# Patient Record
Sex: FEMALE | Race: WHITE | URBAN - METROPOLITAN AREA
[De-identification: names, ages, dates, MRNs, and addresses within clinical notes are randomized per-mention and may not be internally consistent; named-entity substitution may affect disease eponyms.]

---

## 2018-01-17 ENCOUNTER — OFFICE VISIT (OUTPATIENT)
Dept: PEDIATRICS | Facility: CLINIC | Age: 23
End: 2018-01-17
Payer: COMMERCIAL

## 2018-01-17 VITALS
HEART RATE: 93 BPM | HEIGHT: 65 IN | OXYGEN SATURATION: 99 % | SYSTOLIC BLOOD PRESSURE: 100 MMHG | WEIGHT: 124 LBS | DIASTOLIC BLOOD PRESSURE: 60 MMHG | BODY MASS INDEX: 20.66 KG/M2 | TEMPERATURE: 98.3 F

## 2018-01-17 DIAGNOSIS — N92.6 IRREGULAR PERIODS: ICD-10-CM

## 2018-01-17 DIAGNOSIS — R10.2 SUPRAPUBIC ABDOMINAL PAIN: Primary | ICD-10-CM

## 2018-01-17 DIAGNOSIS — R30.0 DYSURIA: ICD-10-CM

## 2018-01-17 DIAGNOSIS — Z11.3 ROUTINE SCREENING FOR STI (SEXUALLY TRANSMITTED INFECTION): ICD-10-CM

## 2018-01-17 DIAGNOSIS — N94.6 DYSMENORRHEA: ICD-10-CM

## 2018-01-17 LAB
ALBUMIN UR-MCNC: NEGATIVE MG/DL
APPEARANCE UR: CLEAR
BETA HCG QUAL IFA URINE: NEGATIVE
BILIRUB UR QL STRIP: NEGATIVE
COLOR UR AUTO: YELLOW
GLUCOSE UR STRIP-MCNC: NEGATIVE MG/DL
HGB UR QL STRIP: NEGATIVE
KETONES UR STRIP-MCNC: NEGATIVE MG/DL
LEUKOCYTE ESTERASE UR QL STRIP: NEGATIVE
NITRATE UR QL: NEGATIVE
PH UR STRIP: 5.5 PH (ref 5–7)
PROLACTIN SERPL-MCNC: 19 UG/L (ref 3–27)
SOURCE: NORMAL
SP GR UR STRIP: 1.01 (ref 1–1.03)
UROBILINOGEN UR STRIP-ACNC: 0.2 EU/DL (ref 0.2–1)

## 2018-01-17 PROCEDURE — 86780 TREPONEMA PALLIDUM: CPT | Performed by: INTERNAL MEDICINE

## 2018-01-17 PROCEDURE — 84146 ASSAY OF PROLACTIN: CPT | Performed by: INTERNAL MEDICINE

## 2018-01-17 PROCEDURE — 87591 N.GONORRHOEAE DNA AMP PROB: CPT | Performed by: INTERNAL MEDICINE

## 2018-01-17 PROCEDURE — 80053 COMPREHEN METABOLIC PANEL: CPT | Performed by: INTERNAL MEDICINE

## 2018-01-17 PROCEDURE — 84703 CHORIONIC GONADOTROPIN ASSAY: CPT | Performed by: INTERNAL MEDICINE

## 2018-01-17 PROCEDURE — 87389 HIV-1 AG W/HIV-1&-2 AB AG IA: CPT | Performed by: INTERNAL MEDICINE

## 2018-01-17 PROCEDURE — 36415 COLL VENOUS BLD VENIPUNCTURE: CPT | Performed by: INTERNAL MEDICINE

## 2018-01-17 PROCEDURE — 87491 CHLMYD TRACH DNA AMP PROBE: CPT | Performed by: INTERNAL MEDICINE

## 2018-01-17 PROCEDURE — 81003 URINALYSIS AUTO W/O SCOPE: CPT | Performed by: INTERNAL MEDICINE

## 2018-01-17 PROCEDURE — 99204 OFFICE O/P NEW MOD 45 MIN: CPT | Performed by: INTERNAL MEDICINE

## 2018-01-17 PROCEDURE — 84443 ASSAY THYROID STIM HORMONE: CPT | Performed by: INTERNAL MEDICINE

## 2018-01-17 RX ORDER — IBUPROFEN 600 MG/1
600 TABLET, FILM COATED ORAL
COMMUNITY
Start: 2017-11-10 | End: 2018-02-05

## 2018-01-17 ASSESSMENT — PATIENT HEALTH QUESTIONNAIRE - PHQ9
SUM OF ALL RESPONSES TO PHQ QUESTIONS 1-9: 6
SUM OF ALL RESPONSES TO PHQ QUESTIONS 1-9: 6
10. IF YOU CHECKED OFF ANY PROBLEMS, HOW DIFFICULT HAVE THESE PROBLEMS MADE IT FOR YOU TO DO YOUR WORK, TAKE CARE OF THINGS AT HOME, OR GET ALONG WITH OTHER PEOPLE: SOMEWHAT DIFFICULT

## 2018-01-17 NOTE — NURSING NOTE
"Chief Complaint   Patient presents with     Physical     Abdominal Pain       Initial /60 (BP Location: Right arm, Cuff Size: Adult Regular)  Pulse 93  Temp 98.3  F (36.8  C) (Oral)  Ht 5' 5\" (1.651 m)  Wt 124 lb (56.2 kg)  SpO2 99%  BMI 20.63 kg/m2 Estimated body mass index is 20.63 kg/(m^2) as calculated from the following:    Height as of this encounter: 5' 5\" (1.651 m).    Weight as of this encounter: 124 lb (56.2 kg).  Medication Reconciliation: complete   Toshia Childs MA    "

## 2018-01-17 NOTE — PROGRESS NOTES
SUBJECTIVE:   Elle Elizondo is a 22 year old female who presents to clinic today for the following health issues:    ABDOMINAL   PAIN     Onset: Ongoing for a few months    Description:   Character: Stabbing and Fullness  Location: pelvic region  Radiation: None    Intensity: moderate    Progression of Symptoms:  worsening    Accompanying Signs & Symptoms:  Fever/Chills?: Chills  Gas/Bloating: YES  Nausea: YES  Vomitting: YES  Diarrhea?: YES  Constipation:no   Dysuria or Hematuria: no    History:   Trauma: no   Previous similar pain: no    Previous tests done: Was seen in [facility] ER 1 month ago for abdominal pain ? UTI.     Precipitating factors:   Does the pain change with:     Food: no    BM: no     Urination: YES    Alleviating factors: None.      Therapies Tried and outcome: Nitrofurantonin     LMP:       Here for a Physical but decided to focus instead on abdominal pain.     PHQ-2 Score:     PHQ-2 ( 1999 Pfizer) 1/17/2018   Q1: Little interest or pleasure in doing things 1   Q2: Feeling down, depressed or hopeless 2   PHQ-2 Score 3   Q1: Little interest or pleasure in doing things Several days   Q2: Feeling down, depressed or hopeless More than half the days   PHQ-2 Score 3     PHQ-9 SCORE 1/17/2018   Total Score MyChart 6 (Mild depression)   Total Score 6     Pain has been on and off for several months.   Was evaluated at [facility] on 11/10/17 for this -> diagnosed with hemorrhagic cystitis and given an antibiotic.   She was given a referral to OB-GYN but has not seen them.  Pain did improve with the antibiotic.     She generally notices the pain with her periods.   Got her period at age 13 and always had cramps. But this is much more severe and makes her want to vomit.  Periods have been more irregular the past several months. +/- 7 days.  Lasting shorter amount of days, less heavy. Sometimes only lasts one day.     Pain is becoming more persistent.  Had severe pain 1 week ago.  Had some pain  "this morning.  Has a medication from [place] that helps - not sure of the name.   Took a dose of ibuprofen but felt that it made her feel sick.    LMP 12/22/17 - lasted one day.  Not doing anything for pregnancy prevention.   Would be okay if she got pregnant but also willing to be on birth control to help the pain.    Sexually active with 1 male partner ().   No history of unwanted sexual touch.  Some clear vaginal discharge - this hasn't changed.   No history of STIs.    Had an appendectomy a few years ago. They told her her ovary had cysts on it but that this wasn't unusual.     Problem list and histories reviewed & adjusted, as indicated.  Additional history: as documented    There is no problem list on file for this patient.    Past Surgical History:   Procedure Laterality Date     APPENDECTOMY  2016       Social History   Substance Use Topics     Smoking status: Never Smoker     Smokeless tobacco: Never Used     Alcohol use No     Family History   Problem Relation Age of Onset     Family History Negative Mother      Family History Negative Father      Family History Negative Brother      Family History Negative Brother      Family History Negative Brother          Current Outpatient Prescriptions   Medication Sig Dispense Refill     ibuprofen (ADVIL/MOTRIN) 600 MG tablet Take 600 mg by mouth       No Known Allergies    Reviewed and updated as needed this visit by clinical staffTobacco  Allergies  Meds  Med Hx  Surg Hx  Fam Hx       Reviewed and updated as needed this visit by Provider  Med Hx  Surg Hx  Fam Hx       ROS:  Constitutional, HEENT, cardiovascular, pulmonary, GI, , musculoskeletal, neuro, skin, endocrine and psych systems are negative, except as otherwise noted.    OBJECTIVE:   /60 (BP Location: Right arm, Cuff Size: Adult Regular)  Pulse 93  Temp 98.3  F (36.8  C) (Oral)  Ht 5' 5\" (1.651 m)  Wt 124 lb (56.2 kg)  SpO2 99%  BMI 20.63 kg/m2  Body mass index is 20.63 " kg/(m^2).     GENERAL: healthy, alert and no distress  EYES: Eyes grossly normal to inspection, PERRL and conjunctivae and sclerae normal  HENT: ear canals and TM's normal, nose and mouth without ulcers or lesions  NECK: no adenopathy, no asymmetry, masses, or scars and thyroid normal to palpation  RESP: lungs clear to auscultation - no rales, rhonchi or wheezes  CV: regular rate and rhythm, normal S1 S2, no S3 or S4, no murmur, click or rub, no peripheral edema and peripheral pulses strong  ABDOMEN: soft, minimal suprapubic tenderness, no hepatosplenomegaly, no masses and bowel sounds normal   (female): normal female external genitalia, normal urethral meatus, vaginal mucosa pink, moist, well rugated, and normal cervix/adnexa/uterus without masses or discharge  MS: no gross musculoskeletal defects noted, no edema  SKIN: no suspicious lesions or rashes  NEURO: Normal strength and tone, mentation intact and speech normal  PSYCH: mentation appears normal, affect normal/bright    Diagnostic Test Results:  Results for orders placed or performed in visit on 01/17/18 (from the past 24 hour(s))   UA reflex to Microscopic and Culture   Result Value Ref Range    Color Urine Yellow     Appearance Urine Clear     Glucose Urine Negative NEG^Negative mg/dL    Bilirubin Urine Negative NEG^Negative    Ketones Urine Negative NEG^Negative mg/dL    Specific Gravity Urine 1.010 1.003 - 1.035    Blood Urine Negative NEG^Negative    pH Urine 5.5 5.0 - 7.0 pH    Protein Albumin Urine Negative NEG^Negative mg/dL    Urobilinogen Urine 0.2 0.2 - 1.0 EU/dL    Nitrite Urine Negative NEG^Negative    Leukocyte Esterase Urine Negative NEG^Negative    Source Midstream Urine    Beta HCG qual IFA urine - [facility]   Result Value Ref Range    Beta HCG Qual IFA Urine Negative NEG^Negative        Other labs pending.    ASSESSMENT/PLAN:       ICD-10-CM    1. Suprapubic abdominal pain R10.2 NEISSERIA GONORRHOEA PCR     CHLAMYDIA TRACHOMATIS  PCR     Comprehensive metabolic panel     hCG qual urine POCT     US Pelvic Complete with Transvaginal     Beta HCG qual IFA urine - [facility] and [facility]   2. Dysmenorrhea N94.6    3. Dysuria R30.0 UA reflex to Microscopic and Culture   4. Irregular periods N92.6 TSH with free T4 reflex     Prolactin     Beta HCG qual IFA urine - [facility] and [place]   5. Routine screening for STI (sexually transmitted infection) Z11.3 HIV Antigen Antibody Combo     Anti Treponema     Suspect suprapubic pain is related to menstrual cycles however on history is becoming more persistent. She is not using anything for pregnancy prevention and is sexually active - UPT negative; atopic is possible but less likely given duration of symptoms. UA neg for any infection. No history of STIs and no cervical motion tenderness. Ovarian cysts are also possible. Will start with basic labs and an ultrasound. If normal will consider treatment with OCPs for dysmenorrhea- briefly discussed with patient and she is open to starting birth control if it helps with her pain. Variation in cycles likely normal but will also check thyroid and prolactin. She has no hirsutism and is not overweight - less likely to have PCOS.    Patient Instructions   Nice to meet you today!    Labs today.     Pelvic ultrasound - this is done at [facility]. They will call you to schedule.     Make an appointment to see me at the end of next week - we can talk about your lab and imaging results.    If you feel like you are getting your period - take the ibuprofen 600 mg right away. Take this with food. Try a heating pad on your lower back.    After we figure out your abdominal pain I will have you come back to see me for a Wellness exam.    Price Mcclellan MD  [facility]

## 2018-01-17 NOTE — MR AVS SNAPSHOT
After Visit Summary   1/17/2018    Elle Elizondo    MRN: [id number]           Patient Information     Date Of Birth          1995        Visit Information        Provider Department      1/17/2018 11:10 AM Price Mcclellan MD [facility]        Today's Diagnoses     Dysuria    -  1    Suprapubic abdominal pain        Irregular periods        Routine screening for STI (sexually transmitted infection)          Care Instructions    Nice to meet you today!    Labs today.     Pelvic ultrasound - this is done at [facility]. They will call you to schedule.     Make an appointment to see me at the end of next week - we can talk about your lab and imaging results.    If you feel like you are getting your period - take the ibuprofen 600 mg right away. Take this with food. Try a heating pad on your lower back.    After we figure out your abdominal pain I will have you come back to see me for a Wellness exam.      Preventive Health Recommendations  Female Ages 18 to 25     Yearly exam:     See your health care provider every year in order to  o Review health changes.   o Discuss preventive care.    o Review your medicines if your doctor has prescribed any.      You should be tested each year for STDs (sexually transmitted diseases).       After age 20, talk to your provider about how often you should have cholesterol testing.      Starting at age 21, get a Pap test every three years. If you have an abnormal result, your doctor may have you test more often.      If you are at risk for diabetes, you should have a diabetes test (fasting glucose).     Shots:     Get a flu shot each year.     Get a tetanus shot every 10 years.     Consider getting the shot (vaccine) that prevents cervical cancer (Gardasil).    Nutrition:     Eat at least 5 servings of fruits and vegetables each day.    Eat whole-grain bread, whole-wheat pasta and brown rice instead of white grains and rice.    Talk  "to your provider about Calcium and Vitamin D.     Lifestyle    Exercise at least 150 minutes a week each week (30 minutes a day, 5 days a week). This will help you control your weight and prevent disease.    Limit alcohol to one drink per day.    No smoking.     Wear sunscreen to prevent skin cancer.    See your dentist every six months for an exam and cleaning.          Follow-ups after your visit        Follow-up notes from your care team     Return in about 1 week (around 1/24/2018).      Future tests that were ordered for you today     Open Future Orders        Priority Expected Expires Ordered    US Pelvic Complete with Transvaginal Routine  1/17/2019 1/17/2018            Who to contact     If you have questions or need follow up information about today's clinic visit or your schedule please contact [facility] directly at [phone].  Normal or non-critical lab and imaging results will be communicated to you by [employer], letter or phone within 4 business days after the clinic has received the results. If you do not hear from us within 7 days, please contact the clinic through [employer] or phone. If you have a critical or abnormal lab result, we will notify you by phone as soon as possible.  Submit refill requests through [employer]ZA or call your pharmacy and they will forward the refill request to us. Please allow 3 business days for your refill to be completed.          Additional Information About Your Visit        ELENZA Information     ELENZA lets you send messages to your doctor, view your test results, renew your prescriptions, schedule appointments and more. To sign up, go to [email or web address] . Click on \"Log in\" on the left side of the screen, which will take you to the Welcome page. Then click on \"Sign up Now\" on the right side of the page.     You will be asked to enter the access code listed below, as well as some personal information. Please follow the directions to create your " "username and password.     Your access code is: [email or web address]  Expires: 2018 12:10 PM     Your access code will  in 90 days. If you need help or a new code, please call your [facility] clinic or [phone].        Care EveryWhere ID     This is your Care EveryWhere ID. This could be used by other organizations to access your [facility] medical records  [id number]        Your Vitals Were     Pulse Temperature Height Pulse Oximetry BMI (Body Mass Index)       93 98.3  F (36.8  C) (Oral) 5' 5\" (1.651 m) 99% 20.63 kg/m2        Blood Pressure from Last 3 Encounters:   18 100/60    Weight from Last 3 Encounters:   18 124 lb (56.2 kg)              We Performed the Following     Anti Treponema     CHLAMYDIA TRACHOMATIS PCR     Comprehensive metabolic panel     hCG qual urine POCT     HIV Antigen Antibody Combo     NEISSERIA GONORRHOEA PCR     Prolactin     TSH with free T4 reflex     UA reflex to Microscopic and Culture        Primary Care Provider Office Phone # Fax #    Price Jocy Mcclellan [phone]       [facility] [address]        Equal Access to Services     IMELDA GILES AH: Hadii jessenia richmond hadasho Soomaali, waaxda luqadaha, qaybta kaalmada adeegyada, annette mancian yoel singh. So [facility] [phone].    ATENCIÓN: Si habla español, tiene a crocker disposición servicios gratuitos de asistencia lingüística. Rosangela al [phone].    We comply with applicable federal civil rights laws and [place] laws. We do not discriminate on the basis of race, color, national origin, age, disability, sex, sexual orientation, or gender identity.            Thank you!     Thank you for choosing [facility]  for your care. Our goal is always to provide you with excellent care. Hearing back from our patients is one way we can continue to improve our services. Please take a few minutes to complete the written survey that you may receive in the mail after your " visit with us. Thank you!             Your Updated Medication List - Protect others around you: Learn how to safely use, store and throw away your medicines at www.disposemymeds.org.      Notice  As of 1/17/2018 12:10 PM    You have not been prescribed any medications.

## 2018-01-17 NOTE — PROGRESS NOTES
"  SUBJECTIVE:   Elle Elizondo is a 22 year old female who presents to clinic today for the following health issues:    ABDOMINAL   PAIN     Onset: Ongoing for a few months    Description:   Character: Stabbing and Fullness  Location: pelvic region  Radiation: None    Intensity: moderate    Progression of Symptoms:  worsening    Accompanying Signs & Symptoms:  Fever/Chills?: Chills  Gas/Bloating: YES  Nausea: YES  Vomitting: YES  Diarrhea?: YES  Constipation:no   Dysuria or Hematuria: no    History:   Trauma: no   Previous similar pain: no    Previous tests done: Was seen in [facility] 1 month ago for abdominal pain ? UTI.     Precipitating factors:   Does the pain change with:     Food: YES     BM: no     Urination: YES    Alleviating factors:      Therapies Tried and outcome: Nitrofurantonin     LMP:       Here for a Physical but decided to focus instead on abdominal pain.     Was seen once before     {additional problems for provider to add:390541}    Problem list and histories reviewed & adjusted, as indicated.  Additional history: {NONE - AS DOCUMENTED:917118::\"as documented\"}    {HIST REVIEW/ LINKS 2:559592}    Reviewed and updated as needed this visit by clinical staff  Tobacco  Allergies  Meds       Reviewed and updated as needed this visit by Provider         {PROVIDER CHARTING PREFERENCE:887989}      CC: Elle Elizondo is an 22 year old woman who presents for preventive health visit.     Physical   Annual:     Getting at least 3 servings of Calcium per day::  Yes    Bi-annual eye exam::  NO    Dental care twice a year::  NO    Sleep apnea or symptoms of sleep apnea::  None    Diet::  Regular (no restrictions)    Frequency of exercise::  None    Taking medications regularly::  Yes    Medication side effects::  None    Additional concerns today::  No                Today's PHQ-2 Score:   PHQ-2 ( 1999 Pfizer) 1/17/2018   Q1: Little interest or pleasure in doing things 1   Q2: Feeling down, " "depressed or hopeless 2   PHQ-2 Score 3   Q1: Little interest or pleasure in doing things Several days   Q2: Feeling down, depressed or hopeless More than half the days   PHQ-2 Score 3       Abuse: Current or Past(Physical, Sexual or Emotional)- No  Do you feel safe in your environment - Yes    Social History   Substance Use Topics     Smoking status: Never Smoker     Smokeless tobacco: Never Used     Alcohol use No     Alcohol Use 1/17/2018   If you drink alcohol, do you typically have greater than 3 drinks per day OR greater than 7 drinks per week?   No   {add AUDIT responses (Optional) (A score of 7 for adult men is an indication of hazardous drinking; a score of 8 or more is an indication of an alcohol use disorder.  A score of 7 or more for adult women is an indication of hazardous drinking or an alchohol use disorder):106806}    Reviewed orders with patient.  Reviewed health maintenance and updated orders accordingly - {Yes/No:544554::\"Yes\"}  {Chronicprobdata (Optional):788419}    {Mammo Decision Support (Optional):366403}    Pertinent mammograms are reviewed under the imaging tab.  History of abnormal Pap smear: {PAP HX:829751}    Reviewed and updated as needed this visit by clinical staff  Tobacco  Allergies  Meds         Reviewed and updated as needed this visit by Provider        {HISTORY OPTIONS (Optional):403456}    Review of Systems  {FEMALE PREVENTATIVE ROS:457129}     OBJECTIVE:   /60 (BP Location: Right arm, Cuff Size: Adult Regular)  Pulse 93  Temp 98.3  F (36.8  C) (Oral)  Ht 5' 5\" (1.651 m)  Wt 124 lb (56.2 kg)  SpO2 99%  BMI 20.63 kg/m2  Physical Exam  {Exam Choices:555528}    ASSESSMENT/PLAN:   {Diag Picklist:955017}    COUNSELING:  {FEMALE COUNSELING MESSAGES:314525::\"Reviewed preventive health counseling, as reflected in patient instructions\"}    {BP Counseling- Complete if BP >= 120/80  (Optional):571749}     reports that she has never smoked. She has never used smokeless " "tobacco.  {Tobacco Cessation -- Complete if patient is a smoker (Optional):161631}  Estimated body mass index is 20.63 kg/(m^2) as calculated from the following:    Height as of this encounter: 5' 5\" (1.651 m).    Weight as of this encounter: 124 lb (56.2 kg).       Counseling Resources:  ATP IV Guidelines  Pooled Cohorts Equation Calculator  Breast Cancer Risk Calculator  FRAX Risk Assessment  ICSI Preventive Guidelines  Dietary Guidelines for Americans, 2010  USDA's MyPlate  ASA Prophylaxis  Lung CA Screening    Price Mcclellan MD  [facility]  "

## 2018-01-17 NOTE — PATIENT INSTRUCTIONS
Nice to meet you today!    Labs today.     Pelvic ultrasound - this is done at [facility]. They will call you to schedule.     Make an appointment to see me at the end of next week - we can talk about your lab and imaging results.    If you feel like you are getting your period - take the ibuprofen 600 mg right away. Take this with food. Try a heating pad on your lower back.    After we figure out your abdominal pain I will have you come back to see me for a Wellness exam.      Preventive Health Recommendations  Female Ages 18 to 25     Yearly exam:     See your health care provider every year in order to  o Review health changes.   o Discuss preventive care.    o Review your medicines if your doctor has prescribed any.      You should be tested each year for STDs (sexually transmitted diseases).       After age 20, talk to your provider about how often you should have cholesterol testing.      Starting at age 21, get a Pap test every three years. If you have an abnormal result, your doctor may have you test more often.      If you are at risk for diabetes, you should have a diabetes test (fasting glucose).     Shots:     Get a flu shot each year.     Get a tetanus shot every 10 years.     Consider getting the shot (vaccine) that prevents cervical cancer (Gardasil).    Nutrition:     Eat at least 5 servings of fruits and vegetables each day.    Eat whole-grain bread, whole-wheat pasta and brown rice instead of white grains and rice.    Talk to your provider about Calcium and Vitamin D.     Lifestyle    Exercise at least 150 minutes a week each week (30 minutes a day, 5 days a week). This will help you control your weight and prevent disease.    Limit alcohol to one drink per day.    No smoking.     Wear sunscreen to prevent skin cancer.    See your dentist every six months for an exam and cleaning.

## 2018-01-17 NOTE — LETTER
[facility]  [address] [address]                  [phone]   January 18, 2018    Elle Elizondo  [address]   [place] [address]        Hi Ms. Elizondo,     It was nice to meet you in clinic this week. Good news - your labs are normal. Your electrolytes, kidney function, liver enzymes were normal (checked because of your pain). Your standard STI screening was normal. Your prolactin and thyroid were normal (checked because of irregular periods). I will be in touch with your ultrasound results when I see them. We can discuss these in more detail when I see you next week.     Please let me know if you have questions,   Jocy Mcclellan MD       Results for orders placed or performed in visit on 01/17/18   UA reflex to Microscopic and Culture   Result Value Ref Range    Color Urine Yellow     Appearance Urine Clear     Glucose Urine Negative NEG^Negative mg/dL    Bilirubin Urine Negative NEG^Negative    Ketones Urine Negative NEG^Negative mg/dL    Specific Gravity Urine 1.010 1.003 - 1.035    Blood Urine Negative NEG^Negative    pH Urine 5.5 5.0 - 7.0 pH    Protein Albumin Urine Negative NEG^Negative mg/dL    Urobilinogen Urine 0.2 0.2 - 1.0 EU/dL    Nitrite Urine Negative NEG^Negative    Leukocyte Esterase Urine Negative NEG^Negative    Source Midstream Urine    Comprehensive metabolic panel   Result Value Ref Range    Sodium 140 133 - 144 mmol/L    Potassium 3.9 3.4 - 5.3 mmol/L    Chloride 105 94 - 109 mmol/L    Carbon Dioxide 25 20 - 32 mmol/L    Anion Gap 10 3 - 14 mmol/L    Glucose 76 70 - 99 mg/dL    Urea Nitrogen 10 7 - 30 mg/dL    Creatinine 0.64 0.52 - 1.04 mg/dL    GFR Estimate >90 >60 mL/min/1.7m2    GFR Estimate If Black >90 >60 mL/min/1.7m2    Calcium 8.9 8.5 - 10.1 mg/dL    Bilirubin Total 0.8 0.2 - 1.3 mg/dL    Albumin 4.2 3.4 - 5.0 g/dL    Protein Total 7.0 6.8 - 8.8 g/dL    Alkaline Phosphatase 56 40 - 150 U/L    ALT 14 0  - 50 U/L    AST 15 0 - 45 U/L   TSH with free T4 reflex   Result Value Ref Range    TSH 1.44 0.40 - 4.00 mU/L   Prolactin   Result Value Ref Range    Prolactin 19 3 - 27 ug/L   HIV Antigen Antibody Combo   Result Value Ref Range    HIV Antigen Antibody Combo Nonreactive NR^Nonreactive       Anti Treponema   Result Value Ref Range    Treponema pallidum Antibody Negative NEG^Negative   Beta HCG qual IFA urine - [facility] and [facility]   Result Value Ref Range    Beta HCG Qual IFA Urine Negative NEG^Negative      NEISSERIA GONORRHOEA PCR   Result Value Ref Range    Specimen Descrip Cervix     N Gonorrhea PCR Negative NEG^Negative   CHLAMYDIA TRACHOMATIS PCR   Result Value Ref Range    Specimen Description Cervix     Chlamydia Trachomatis PCR Negative NEG^Negative

## 2018-01-18 LAB
ALBUMIN SERPL-MCNC: 4.2 G/DL (ref 3.4–5)
ALP SERPL-CCNC: 56 U/L (ref 40–150)
ALT SERPL W P-5'-P-CCNC: 14 U/L (ref 0–50)
ANION GAP SERPL CALCULATED.3IONS-SCNC: 10 MMOL/L (ref 3–14)
AST SERPL W P-5'-P-CCNC: 15 U/L (ref 0–45)
BILIRUB SERPL-MCNC: 0.8 MG/DL (ref 0.2–1.3)
BUN SERPL-MCNC: 10 MG/DL (ref 7–30)
C TRACH DNA SPEC QL NAA+PROBE: NEGATIVE
CALCIUM SERPL-MCNC: 8.9 MG/DL (ref 8.5–10.1)
CHLORIDE SERPL-SCNC: 105 MMOL/L (ref 94–109)
CO2 SERPL-SCNC: 25 MMOL/L (ref 20–32)
CREAT SERPL-MCNC: 0.64 MG/DL (ref 0.52–1.04)
GFR SERPL CREATININE-BSD FRML MDRD: >90 ML/MIN/1.7M2
GLUCOSE SERPL-MCNC: 76 MG/DL (ref 70–99)
HIV 1+2 AB+HIV1 P24 AG SERPL QL IA: NONREACTIVE
N GONORRHOEA DNA SPEC QL NAA+PROBE: NEGATIVE
POTASSIUM SERPL-SCNC: 3.9 MMOL/L (ref 3.4–5.3)
PROT SERPL-MCNC: 7 G/DL (ref 6.8–8.8)
SODIUM SERPL-SCNC: 140 MMOL/L (ref 133–144)
SPECIMEN SOURCE: NORMAL
SPECIMEN SOURCE: NORMAL
T PALLIDUM IGG+IGM SER QL: NEGATIVE
TSH SERPL DL<=0.005 MIU/L-ACNC: 1.44 MU/L (ref 0.4–4)

## 2018-01-19 ASSESSMENT — PATIENT HEALTH QUESTIONNAIRE - PHQ9: SUM OF ALL RESPONSES TO PHQ QUESTIONS 1-9: 6

## 2018-01-29 ENCOUNTER — HEALTH MAINTENANCE LETTER (OUTPATIENT)
Age: 23
End: 2018-01-29

## 2018-02-01 ENCOUNTER — RADIANT APPOINTMENT (OUTPATIENT)
Dept: ULTRASOUND IMAGING | Facility: CLINIC | Age: 23
End: 2018-02-01
Attending: INTERNAL MEDICINE
Payer: COMMERCIAL

## 2018-02-01 DIAGNOSIS — R10.2 SUPRAPUBIC ABDOMINAL PAIN: ICD-10-CM

## 2018-02-01 PROCEDURE — 76856 US EXAM PELVIC COMPLETE: CPT | Performed by: OBSTETRICS & GYNECOLOGY

## 2018-02-01 PROCEDURE — 76830 TRANSVAGINAL US NON-OB: CPT | Performed by: OBSTETRICS & GYNECOLOGY

## 2018-02-05 ENCOUNTER — OFFICE VISIT (OUTPATIENT)
Dept: PEDIATRICS | Facility: CLINIC | Age: 23
End: 2018-02-05
Payer: COMMERCIAL

## 2018-02-05 VITALS
SYSTOLIC BLOOD PRESSURE: 98 MMHG | TEMPERATURE: 98.3 F | HEART RATE: 92 BPM | BODY MASS INDEX: 20.72 KG/M2 | DIASTOLIC BLOOD PRESSURE: 62 MMHG | WEIGHT: 124.5 LBS | OXYGEN SATURATION: 99 %

## 2018-02-05 DIAGNOSIS — R10.2 PELVIC PAIN IN FEMALE: Primary | ICD-10-CM

## 2018-02-05 DIAGNOSIS — N83.202 CYST OF LEFT OVARY: ICD-10-CM

## 2018-02-05 DIAGNOSIS — Z78.9 TRYING TO GET PREGNANT: ICD-10-CM

## 2018-02-05 PROCEDURE — 99213 OFFICE O/P EST LOW 20 MIN: CPT | Performed by: INTERNAL MEDICINE

## 2018-02-05 RX ORDER — IBUPROFEN 600 MG/1
600 TABLET, FILM COATED ORAL EVERY 6 HOURS PRN
Qty: 90 TABLET | Refills: 1 | Status: SHIPPED | OUTPATIENT
Start: 2018-02-05 | End: 2018-07-23

## 2018-02-05 NOTE — MR AVS SNAPSHOT
After Visit Summary   2/5/2018    Elle Elizondo    MRN: [id number]           Patient Information     Date Of Birth          1995        Visit Information        Provider Department      2/5/2018 8:50 AM Price Mcclellan MD [facility]        Today's Diagnoses     Pelvic pain in female    -  1    Cyst of left ovary          Care Instructions    Nice to see you today!     We will repeat the ultrasound in 6-8 weeks to see if the cyst has changed.     Referral to OB-GYN. Can discuss cyst and plans to get pregnant. You need to call to set up the appointment.    Take the ibuprofen when you start to feel the pain. Make sure to take with food.    Come back to see me after you see the OB doctors.          Follow-ups after your visit        Additional Services     OB/GYN REFERRAL       Your provider has referred you to:  FMG: [facility] [phone]   [email or web address]/[place]/    FMG: [facility] [phone]   [email or web address]://[email or web address].[email or web address]/[place]/  FMG: [facility] [phone]  http://www.[facility]  FMG: [facility] [phone]   http://www.[place].[facility]/[place]/  FHN: [facility] [place] [phone]   [email or web address]  [place] [phone]   http://[email or web address]/  FHN: [facility] [place] [phone]   [email or web address]  [place] [phone]   [email or web address]  FHN: [facility] [place] [phone]   [email or web address]://[email or web address]  [place] [phone]   [email or web address]    Please be aware that coverage of these services is subject to the terms and limitations of your health insurance plan.  Call member services at your health plan with any benefit or coverage questions.      Please bring the  "following with you to your appointment:    (1) Any X-Rays, CTs or MRIs which have been performed.  Contact the facility where they were done to arrange for  prior to your scheduled appointment.   (2) List of current medications   (3) This referral request   (4) Any documents/labs given to you for this referral                  Follow-up notes from your care team     Return in about 4 weeks (around 3/5/2018) for Wellness Visit.      Future tests that were ordered for you today     Open Future Orders        Priority Expected Expires Ordered    US Pelvic Complete with Transvaginal Routine 3/19/2018 2/5/2019 2/5/2018            Who to contact     If you have questions or need follow up information about today's clinic visit or your schedule please contact [facility] directly at [phone].  Normal or non-critical lab and imaging results will be communicated to you by MyChart, letter or phone within 4 business days after the clinic has received the results. If you do not hear from us within 7 days, please contact the clinic through MyChart or phone. If you have a critical or abnormal lab result, we will notify you by phone as soon as possible.  Submit refill requests through [email or web address] or call your pharmacy and they will forward the refill request to us. Please allow 3 business days for your refill to be completed.          Additional Information About Your Visit        [email or web address] Information     [email or web address] lets you send messages to your doctor, view your test results, renew your prescriptions, schedule appointments and more. To sign up, go to [email or web address] . Click on \"Log in\" on the left side of the screen, which will take you to the Welcome page. Then click on \"Sign up Now\" on the right side of the page.     You will be asked to enter the access code listed below, as well as some personal information. Please follow the directions to create your username and password.     Your access code is: " [email or web address]  Expires: 2018  9:24 AM     Your access code will  in 90 days. If you need help or a new code, please call your [place] clinic or [phone].        Care EveryWhere ID     This is your Care EveryWhere ID. This could be used by other organizations to access your [place] medical records  [id number]        Your Vitals Were     Pulse Temperature Last Period Pulse Oximetry BMI (Body Mass Index)       92 98.3  F (36.8  C) (Oral) 2018 (Exact Date) 99% 20.72 kg/m2        Blood Pressure from Last 3 Encounters:   18 98/62   18 100/60    Weight from Last 3 Encounters:   18 124 lb 8 oz (56.5 kg)   18 124 lb (56.2 kg)              We Performed the Following     OB/GYN REFERRAL          Today's Medication Changes          These changes are accurate as of 18  9:31 AM.  If you have any questions, ask your nurse or doctor.               These medicines have changed or have updated prescriptions.        Dose/Directions    * ibuprofen 600 MG tablet   Commonly known as:  ADVIL/MOTRIN   This may have changed:  Another medication with the same name was added. Make sure you understand how and when to take each.   Changed by:  Price Mcclellan MD        Dose:  600 mg   Take 600 mg by mouth   Refills:  0       * ibuprofen 600 MG tablet   Commonly known as:  ADVIL/MOTRIN   This may have changed:  You were already taking a medication with the same name, and this prescription was added. Make sure you understand how and when to take each.   Used for:  Pelvic pain in female   Changed by:  Price Mcclellan MD        Dose:  600 mg   Take 1 tablet (600 mg) by mouth every 6 hours as needed for moderate pain   Quantity:  90 tablet   Refills:  1       * Notice:  This list has 2 medication(s) that are the same as other medications prescribed for you. Read the directions carefully, and ask your doctor or other care provider to review them with you.         Where to get  your medicines      These medications were sent to [facility] [place] [facility] [place] - [address]   [address]  [address], [place] [address]     Phone:  [phone]     ibuprofen 600 MG tablet                Primary Care Provider Office Phone # Fax #    Price Jocy Mcclellan [phone]       [facility] [address]        Equal Access to Services     PATITE GILES : Hadii aad ku hadasho Soomaali, waaxda luqadaha, qaybta kaalmada adeegyada, waxay idiin hayaan adeeg kharash la'juancarlos . So [facility] [phone].    ATENCIÓN: Si angelicala espgene, tiene a crocker disposición servicios gratuitos de asistencia lingüística. Rosangela al [phone].    We comply with applicable federal civil rights laws and [place] laws. We do not discriminate on the basis of race, color, national origin, age, disability, sex, sexual orientation, or gender identity.            Thank you!     Thank you for choosing [facility]  for your care. Our goal is always to provide you with excellent care. Hearing back from our patients is one way we can continue to improve our services. Please take a few minutes to complete the written survey that you may receive in the mail after your visit with us. Thank you!             Your Updated Medication List - Protect others around you: Learn how to safely use, store and throw away your medicines at www.disposemymeds.org.          This list is accurate as of 2/5/18  9:31 AM.  Always use your most recent med list.                   Brand Name Dispense Instructions for use Diagnosis    * ibuprofen 600 MG tablet    ADVIL/MOTRIN     Take 600 mg by mouth        * ibuprofen 600 MG tablet    ADVIL/MOTRIN    90 tablet    Take 1 tablet (600 mg) by mouth every 6 hours as needed for moderate pain    Pelvic pain in female       * Notice:  This list has 2 medication(s) that are the same as other medications prescribed for you. Read the directions  carefully, and ask your doctor or other care provider to review them with you.

## 2018-02-05 NOTE — PROGRESS NOTES
"  SUBJECTIVE:   Elle Elizondo is a 22 year old female who presents to clinic today for the following health issues:    Patient would like to go over results of ultrasound.    LMP 1/17/18    Not currently having pelvic pain. Anticipating pain soon as it usually starts 1-2 weeks prior to cycle.     She and  are interested in getting pregnant. Have been trying for 5 months. Wonder if they need blood work.  has \"negative\" blood. Wondering if this is why they haven't gotten pregnant. Also wondering if  should get \"sperm tests.\"    It took her mother 2 years to get pregnant.    Problem list and histories reviewed & adjusted, as indicated.  Additional history: as documented    Patient Active Problem List   Diagnosis     Cyst of left ovary     Past Surgical History:   Procedure Laterality Date     APPENDECTOMY  2016       Social History   Substance Use Topics     Smoking status: Never Smoker     Smokeless tobacco: Never Used     Alcohol use No     Family History   Problem Relation Age of Onset     Family History Negative Mother      Family History Negative Father      Family History Negative Brother      Family History Negative Brother      Family History Negative Brother          Current Outpatient Prescriptions   Medication Sig Dispense Refill     ibuprofen (ADVIL/MOTRIN) 600 MG tablet Take 1 tablet (600 mg) by mouth every 6 hours as needed for moderate pain 90 tablet 1     No Known Allergies    Reviewed and updated as needed this visit by clinical staff  Tobacco  Allergies  Meds       Reviewed and updated as needed this visit by Provider         ROS:  Constitutional, HEENT, cardiovascular, pulmonary, gi and gu systems are negative, except as otherwise noted.    OBJECTIVE:     BP 98/62 (BP Location: Right arm, Patient Position: Sitting, Cuff Size: Adult Regular)  Pulse 92  Temp 98.3  F (36.8  C) (Oral)  Wt 124 lb 8 oz (56.5 kg)  LMP 01/17/2018 (Exact Date)  SpO2 99%  BMI 20.72 " kg/m2  Body mass index is 20.72 kg/(m^2).  GENERAL: healthy, alert and no distress    Diagnostic Test Results:  none     ASSESSMENT/PLAN:       ICD-10-CM    1. Pelvic pain in female R10.2 ibuprofen (ADVIL/MOTRIN) 600 MG tablet   2. Cyst of left ovary N83.202 US Pelvic Complete with Transvaginal     OB/GYN REFERRAL   3. Trying to get pregnant Z78.9      Ultrasound with an ovarian cyst - may be CL cyst vs endometrioma. Patient could have endometriosis with severe pain with cycles. Briefly discussed possibility with patient and that standard treatment would be OCPs. However patient desires pregnancy so will refer to OB for further discussion and management. Plan to repeat US in 6-8 weeks. Reviewed that typically tests are not done until people have been trying to get pregnant for one year.    Patient Instructions   Nice to see you today!     We will repeat the ultrasound in 6-8 weeks to see if the cyst has changed.     Referral to OB-GYN. Can discuss cyst and plans to get pregnant. You need to call to set up the appointment.    Take the ibuprofen when you start to feel the pain. Make sure to take with food.    Come back to see me after you see the OB doctors.    15 min spent face to face with patient and more than 50% of the time was spent in counseling and coordination of care of the above issues    Price Mcclellan MD  [facility]

## 2018-02-05 NOTE — NURSING NOTE
"Chief Complaint   Patient presents with     RECHECK     Results     ultrasounds       Initial BP 98/62 (BP Location: Right arm, Patient Position: Sitting, Cuff Size: Adult Regular)  Pulse 92  Temp 98.3  F (36.8  C) (Oral)  Wt 124 lb 8 oz (56.5 kg)  LMP 01/17/2018 (Exact Date)  SpO2 99%  BMI 20.72 kg/m2 Estimated body mass index is 20.72 kg/(m^2) as calculated from the following:    Height as of 1/17/18: 5' 5\" (1.651 m).    Weight as of this encounter: 124 lb 8 oz (56.5 kg).  Medication Reconciliation: jaswinder Kowalski CMA  February 5, 2018, 9:06 AM    "

## 2018-02-05 NOTE — PATIENT INSTRUCTIONS
Nice to see you today!     We will repeat the ultrasound in 6-8 weeks to see if the cyst has changed.     Referral to OB-GYN. Can discuss cyst and plans to get pregnant. You need to call to set up the appointment.    Take the ibuprofen when you start to feel the pain. Make sure to take with food.    Come back to see me after you see the OB doctors.

## 2018-03-21 ENCOUNTER — OFFICE VISIT (OUTPATIENT)
Dept: PEDIATRICS | Facility: CLINIC | Age: 23
End: 2018-03-21
Payer: COMMERCIAL

## 2018-03-21 VITALS
BODY MASS INDEX: 20.79 KG/M2 | TEMPERATURE: 98.8 F | HEIGHT: 65 IN | WEIGHT: 124.8 LBS | DIASTOLIC BLOOD PRESSURE: 60 MMHG | OXYGEN SATURATION: 99 % | SYSTOLIC BLOOD PRESSURE: 98 MMHG | HEART RATE: 80 BPM

## 2018-03-21 DIAGNOSIS — R35.0 URINARY FREQUENCY: ICD-10-CM

## 2018-03-21 DIAGNOSIS — N94.6 DYSMENORRHEA: Primary | ICD-10-CM

## 2018-03-21 PROCEDURE — 99213 OFFICE O/P EST LOW 20 MIN: CPT | Performed by: INTERNAL MEDICINE

## 2018-03-21 NOTE — PATIENT INSTRUCTIONS
For the cramps - we have two questions  1. Why do they come?  - this could be endometriosis - I would need OBs help in diagnosing this.   - this could be really bad period cramps.  We will get another ultrasound to look at the cyst they saw on your ovary. This may help us in diagnosis.     2. How do we treat them?  - Typically we treat both endometriosis and really bad period cramps with ibuprofen and birth control.     In the meantime keep taking the ibuprofen if needed.     Since you are trying to get pregnant I suggest meeting with OB to talk through these two questions.   - in the meantime start a pre-jose daniel vitamin. Any kind with folic acid is okay. They sell these in the pharmacy. You do not need a prescription.   - no alcohol.     Keep up the exercise - this is really good for your overall health!  - goal of 4-5 times per week  - get your heart rate up for 30 min    Make a copy of your vaccination record and can mail it to me/drop it by. That way we know what you've already gotten.

## 2018-03-21 NOTE — MR AVS SNAPSHOT
After Visit Summary   3/21/2018    Elle Elizondo    MRN: [id number]           Patient Information     Date Of Birth          1995        Visit Information        Provider Department      3/21/2018 11:10 AM Price Mcclellan MD [facility]        Today's Diagnoses     Dysmenorrhea    -  1    Urinary frequency          Care Instructions    For the cramps - we have two questions  1. Why do they come?  - this could be endometriosis - I would need OBs help in diagnosing this.   - this could be really bad period cramps.  We will get another ultrasound to look at the cyst they saw on your ovary. This may help us in diagnosis.     2. How do we treat them?  - Typically we treat both endometriosis and really bad period cramps with ibuprofen and birth control.     In the meantime keep taking the ibuprofen if needed.     Since you are trying to get pregnant I suggest meeting with OB to talk through these two questions.   - in the meantime start a pre- vitamin. Any kind with folic acid is okay. They sell these in the pharmacy. You do not need a prescription.   - no alcohol.     Keep up the exercise - this is really good for your overall health!  - goal of 4-5 times per week  - get your heart rate up for 30 min    Make a copy of your vaccination record and can mail it to me/drop it by. That way we know what you've already gotten.          Follow-ups after your visit        Additional Services     OB/GYN REFERRAL       Your provider has referred you to:  FMG: [facility] [phone]   http://www.[place].[facility]/[place]/  FMG: [facility] [phone]   http://www.[place].[facility]/[place]/    FMG: [facility] [phone]   http://[email or web address].[email or web address]/[facility]/[place]/  FHN: [facility] [place] [phone]   [email or web address]  [place] [phone]    "[email or web address]  FHN: [facility] [place] [phone]   [email or web address]://[email or web address]/  [place] [phone]   [email or web address]://[email or web address]/    Please be aware that coverage of these services is subject to the terms and limitations of your health insurance plan.  Call member services at your health plan with any benefit or coverage questions.      Please bring the following with you to your appointment:    (1) Any X-Rays, CTs or MRIs which have been performed.  Contact the facility where they were done to arrange for  prior to your scheduled appointment.   (2) List of current medications   (3) This referral request   (4) Any documents/labs given to you for this referral                  Who to contact     If you have questions or need follow up information about today's clinic visit or your schedule please contact [facility] directly at [phone].  Normal or non-critical lab and imaging results will be communicated to you by Dizkonhart, letter or phone within 4 business days after the clinic has received the results. If you do not hear from us within 7 days, please contact the clinic through Dizkonhart or phone. If you have a critical or abnormal lab result, we will notify you by phone as soon as possible.  Submit refill requests through GoGarden or call your pharmacy and they will forward the refill request to us. Please allow 3 business days for your refill to be completed.          Additional Information About Your Visit        GoGarden Information     GoGarden lets you send messages to your doctor, view your test results, renew your prescriptions, schedule appointments and more. To sign up, go to [email or web address] . Click on \"Log in\" on the left side of the screen, which will take you to the Welcome page. Then click on \"Sign up Now\" on the right side of the page.     You will be asked to enter the access code listed below, as well as some " "personal information. Please follow the directions to create your username and password.     Your access code is: [email or web address]  Expires: 2018 11:51 AM     Your access code will  in 90 days. If you need help or a new code, please call your [place] clinic or [phone].        Care EveryWhere ID     This is your Care EveryWhere ID. This could be used by other organizations to access your [place] medical records  [id number]        Your Vitals Were     Pulse Temperature Height Pulse Oximetry BMI (Body Mass Index)       80 98.8  F (37.1  C) (Oral) 5' 5\" (1.651 m) 99% 20.77 kg/m2        Blood Pressure from Last 3 Encounters:   18 98/60   18 98/62   18 100/60    Weight from Last 3 Encounters:   18 124 lb 12.8 oz (56.6 kg)   18 124 lb 8 oz (56.5 kg)   18 124 lb (56.2 kg)              We Performed the Following     *UA reflex to Microscopic and Culture ([place] and [facility] (except [place] and [place])     OB/GYN REFERRAL        Primary Care Provider Office Phone # Fax #    Price Mcclellan [phone] [address] [address]        Equal Access to Services     IMELDA GILES AH: Hadii jessenia neely Soangela, waaxda luqadaha, qaybta kaalmada desi, annette singh. So [facility] [phone].    ATENCIÓN: Si habla español, tiene a crocker disposición servicios gratuitos de asistencia lingüística. Llame al [phone].    We comply with applicable federal civil rights laws and [place] laws. We do not discriminate on the basis of race, color, national origin, age, disability, sex, sexual orientation, or gender identity.            Thank you!     Thank you for choosing [facility]  for your care. Our goal is always to provide you with excellent care. Hearing back from our patients is one way we can continue to improve our services. Please take a few minutes to complete the written survey " that you may receive in the mail after your visit with us. Thank you!             Your Updated Medication List - Protect others around you: Learn how to safely use, store and throw away your medicines at www.disposemymeds.org.          This list is accurate as of 3/21/18 11:52 AM.  Always use your most recent med list.                   Brand Name Dispense Instructions for use Diagnosis    ibuprofen 600 MG tablet    ADVIL/MOTRIN    90 tablet    Take 1 tablet (600 mg) by mouth every 6 hours as needed for moderate pain    Pelvic pain in female

## 2018-03-21 NOTE — PROGRESS NOTES
SUBJECTIVE:   Elle Elizondo is a 22 year old female who presents to clinic today for the following health issues:    Follow up pelvic pain.     #  Pelvic pain  LMP 3/3/2018  Cycles every 24 days    No pain before cycle last month which was unusual.   Didn't make many changes other than exercising last month.  Last month all 3 days of period were really painful instead of just the first day.    Expecting period in the next few days.   Pain started 3 days ago -> can handle the pain so has not taken any ibuprofen    Pain comes and goes, painful for a few hours, then better, then pain again.  Worse if walking around    Worried because the pain has gotten worse as she has gotten older. Ultimately does not want to be on medication for this.    Still trying to get pregnant    Has not scheduled with OB yet    # Urinary frequency  5 days of going to the bathroom every 20 min only at night  Can't sleep because of this  No pain with urination  No vaginal discharge  No itching    Problem list and histories reviewed & adjusted, as indicated.  Additional history: as documented    Patient Active Problem List   Diagnosis     Cyst of left ovary     Past Surgical History:   Procedure Laterality Date     APPENDECTOMY  2016       Social History   Substance Use Topics     Smoking status: Never Smoker     Smokeless tobacco: Never Used     Alcohol use No     Family History   Problem Relation Age of Onset     Family History Negative Mother      Family History Negative Father      Family History Negative Brother      Family History Negative Brother      Family History Negative Brother          Current Outpatient Prescriptions   Medication Sig Dispense Refill     ibuprofen (ADVIL/MOTRIN) 600 MG tablet Take 1 tablet (600 mg) by mouth every 6 hours as needed for moderate pain 90 tablet 1     No Known Allergies    Reviewed and updated as needed this visit by clinical staff       Reviewed and updated as needed this visit by Provider      "    ROS:  Constitutional, HEENT, cardiovascular, pulmonary, gi and gu systems are negative, except as otherwise noted.    OBJECTIVE:     BP 98/60 (BP Location: Right arm, Patient Position: Chair, Cuff Size: Adult Regular)  Pulse 80  Temp 98.8  F (37.1  C) (Oral)  Ht 5' 5\" (1.651 m)  Wt 124 lb 12.8 oz (56.6 kg)  SpO2 99%  BMI 20.77 kg/m2  Body mass index is 20.77 kg/(m^2).  GENERAL: healthy, alert and no distress  RESP: lungs clear to auscultation - no rales, rhonchi or wheezes  CV: regular rate and rhythm, normal S1 S2, no S3 or S4, no murmur, click or rub, no peripheral edema and peripheral pulses strong  ABDOMEN: soft, nontender, no hepatosplenomegaly, no masses and bowel sounds normal  MS: no gross musculoskeletal defects noted, no edema  SKIN: no suspicious lesions or rashes  PSYCH: mentation appears normal, affect normal/bright    Diagnostic Test Results: Labs pending.    ASSESSMENT/PLAN:     1. Dysmenorrhea  Persistent. DDx includes endometriosis (left ovarian cyst may be endometrioma) vs primary dysmenorrhea. Due for interval pelvic ultrasound to re-evaluate left complex ovarian cyst. Also recommended consultation with Gyn for consideration of endometriosis + management of dysmenorrhea when trying to get pregnant as she is reluctant to take the ibuprofen.  - OB/GYN REFERRAL    2. Urinary frequency  Discussed environmental changes - less h2O after dinner, no caffeine, etc. Will check UA today. No vaginal symptoms to suggest STI. If persistent would do wet prep and GC/Chlam.  - *UA reflex to Microscopic and Culture ([facility] (except [place] and [place])    Patient Instructions   For the cramps - we have two questions  1. Why do they come?  - this could be endometriosis - I would need OBs help in diagnosing this.   - this could be really bad period cramps.  We will get another ultrasound to look at the cyst they saw on your ovary. This may help us in diagnosis.     2. How do we treat " them?  - Typically we treat both endometriosis and really bad period cramps with ibuprofen and birth control.     In the meantime keep taking the ibuprofen if needed.     Since you are trying to get pregnant I suggest meeting with OB to talk through these two questions.   - in the meantime start a pre-jose daniel vitamin. Any kind with folic acid is okay. They sell these in the pharmacy. You do not need a prescription.   - no alcohol.     Keep up the exercise - this is really good for your overall health!  - goal of 4-5 times per week  - get your heart rate up for 30 min    Make a copy of your vaccination record and can mail it to me/drop it by. That way we know what you've already gotten.    Price Mcclellan MD  [facility]

## 2018-03-23 ENCOUNTER — TELEPHONE (OUTPATIENT)
Dept: PEDIATRICS | Facility: CLINIC | Age: 23
End: 2018-03-23

## 2018-03-23 ENCOUNTER — HOSPITAL ENCOUNTER (OUTPATIENT)
Dept: ULTRASOUND IMAGING | Facility: CLINIC | Age: 23
Discharge: HOME | End: 2018-03-23
Attending: INTERNAL MEDICINE | Admitting: INTERNAL MEDICINE
Payer: COMMERCIAL

## 2018-03-23 DIAGNOSIS — Z23 NEED FOR HPV VACCINE: Primary | ICD-10-CM

## 2018-03-23 DIAGNOSIS — N83.202 CYST OF LEFT OVARY: ICD-10-CM

## 2018-03-23 PROCEDURE — 76830 TRANSVAGINAL US NON-OB: CPT

## 2018-03-23 NOTE — TELEPHONE ENCOUNTER
Reason for Call:  Other - Immunizations complete per [place] Dept of State Vaccination Document    Detailed comments: Pt spouse dropped of immunization records as requested at recent visit    Phone Number Patient can be reached at:  [phone] (home)  OK      Best Time: any    Can we leave a detailed message on this number? YES    Call taken on 3/23/2018 at 11:36 AM by Kristin Sy

## 2018-03-23 NOTE — TELEPHONE ENCOUNTER
"Please thank Elle for brining them in!    It looks like she has not had her HPV vaccinations. This is a 3 shot series that helps protect against cervical cancer.  I highly recommend this vaccination. The CDC has great information on this vaccine here: https://www.cdc.gov/hpv/parents/vaccine.html.     She can make a \"Nurse Only\" appointment to start the series. I place a future order for this.     Thank you,   CHAKA Mcclellan MD  Internal Medicine-Pediatrics        "

## 2018-03-26 NOTE — TELEPHONE ENCOUNTER
Spoke with patient and informed her of providers note below. Pt will call back to schedule nurse only.     Renetta Sanchez MA

## 2018-03-27 DIAGNOSIS — R35.0 URINARY FREQUENCY: ICD-10-CM

## 2018-03-27 LAB
ALBUMIN UR-MCNC: ABNORMAL MG/DL
APPEARANCE UR: CLEAR
BACTERIA #/AREA URNS HPF: ABNORMAL /HPF
BILIRUB UR QL STRIP: NEGATIVE
COLOR UR AUTO: YELLOW
GLUCOSE UR STRIP-MCNC: NEGATIVE MG/DL
HGB UR QL STRIP: ABNORMAL
KETONES UR STRIP-MCNC: NEGATIVE MG/DL
LEUKOCYTE ESTERASE UR QL STRIP: NEGATIVE
NITRATE UR QL: NEGATIVE
NON-SQ EPI CELLS #/AREA URNS LPF: ABNORMAL /LPF
PH UR STRIP: 5.5 PH (ref 5–7)
RBC #/AREA URNS AUTO: ABNORMAL /HPF
SOURCE: ABNORMAL
SP GR UR STRIP: 1.01 (ref 1–1.03)
UROBILINOGEN UR STRIP-ACNC: 0.2 EU/DL (ref 0.2–1)
WBC #/AREA URNS AUTO: ABNORMAL /HPF

## 2018-03-27 PROCEDURE — 81001 URINALYSIS AUTO W/SCOPE: CPT | Performed by: INTERNAL MEDICINE

## 2018-03-30 NOTE — TELEPHONE ENCOUNTER
Called patient and spoke with her. Asked her if she wanted to schedule a nurse only appointment to start her HPV vaccine. She said she'd call back. Then she stated that she has an appointment on 4/9/2018, I stated there they should be able to start the series there but if they cant that she will need to come here.  I put a note in the appointment note for them to please start the series at that appointment.  Annette Kowalski CMA  March 30, 2018, 3:57 PM

## 2018-04-09 ENCOUNTER — OFFICE VISIT (OUTPATIENT)
Dept: OBGYN | Facility: CLINIC | Age: 23
End: 2018-04-09
Payer: COMMERCIAL

## 2018-04-09 VITALS
SYSTOLIC BLOOD PRESSURE: 102 MMHG | WEIGHT: 125.4 LBS | HEART RATE: 84 BPM | DIASTOLIC BLOOD PRESSURE: 54 MMHG | BODY MASS INDEX: 20.87 KG/M2

## 2018-04-09 DIAGNOSIS — N92.0 EXCESSIVE OR FREQUENT MENSTRUATION: Primary | ICD-10-CM

## 2018-04-09 DIAGNOSIS — Z23 NEED FOR HPV VACCINATION: ICD-10-CM

## 2018-04-09 DIAGNOSIS — N94.6 DYSMENORRHEA: ICD-10-CM

## 2018-04-09 DIAGNOSIS — Z23 NEED FOR TDAP VACCINATION: ICD-10-CM

## 2018-04-09 PROCEDURE — 90472 IMMUNIZATION ADMIN EACH ADD: CPT | Performed by: OBSTETRICS & GYNECOLOGY

## 2018-04-09 PROCEDURE — 99203 OFFICE O/P NEW LOW 30 MIN: CPT | Mod: 25 | Performed by: OBSTETRICS & GYNECOLOGY

## 2018-04-09 PROCEDURE — 90715 TDAP VACCINE 7 YRS/> IM: CPT | Performed by: OBSTETRICS & GYNECOLOGY

## 2018-04-09 PROCEDURE — 90651 9VHPV VACCINE 2/3 DOSE IM: CPT | Performed by: OBSTETRICS & GYNECOLOGY

## 2018-04-09 PROCEDURE — 90471 IMMUNIZATION ADMIN: CPT | Performed by: OBSTETRICS & GYNECOLOGY

## 2018-04-09 RX ORDER — DROSPIRENONE AND ETHINYL ESTRADIOL 0.03MG-3MG
1 KIT ORAL DAILY
Qty: 84 TABLET | Refills: 3 | Status: SHIPPED | OUTPATIENT
Start: 2018-04-09 | End: 2018-07-23

## 2018-04-09 NOTE — NURSING NOTE
"Chief Complaint   Patient presents with     Abnormal Uterine Bleeding     c/o changes in menses in past few months.  Also due for vaccine updates.  Here today with spouse.       Initial /54  Pulse 84  Wt 125 lb 6.4 oz (56.9 kg)  LMP 2018  BMI 20.87 kg/m2 Estimated body mass index is 20.87 kg/(m^2) as calculated from the following:    Height as of 3/21/18: 5' 5\" (1.651 m).    Weight as of this encounter: 125 lb 6.4 oz (56.9 kg).  BP completed using cuff size: regular        The following HM Due: Vaccinations: HPV / Tdap      The following patient reported/Care Every where data was sent to:  P ABSTRACT QUALITY INITIATIVES [41724]  Eye exam with ophthalmology on this date: 3/2018 [facility]      n/a              "

## 2018-04-09 NOTE — MR AVS SNAPSHOT
After Visit Summary   4/9/2018    Elle Elizondo    MRN: [id number]           Patient Information     Date Of Birth          1995        Visit Information        Provider Department      4/9/2018 5:30 PM Damaso Mendez MD [facility]        Today's Diagnoses     Excessive or frequent menstruation    -  1    Need for Tdap vaccination        Need for HPV vaccination        Dysmenorrhea           Follow-ups after your visit        Who to contact     If you have questions or need follow up information about today's clinic visit or your schedule please contact [facility] directly at [phone].  Normal or non-critical lab and imaging results will be communicated to you by MyChart, letter or phone within 4 business days after the clinic has received the results. If you do not hear from us within 7 days, please contact the clinic through [employer] or phone. If you have a critical or abnormal lab result, we will notify you by phone as soon as possible.  Submit refill requests through [employer] or call your pharmacy and they will forward the refill request to us. Please allow 3 business days for your refill to be completed.          Additional Information About Your Visit        MyChart Information     [employer] gives you secure access to your electronic health record. If you see a primary care provider, you can also send messages to your care team and make appointments. If you have questions, please call your primary care clinic.  If you do not have a primary care provider, please call [phone] and they will assist you.        Care EveryWhere ID     This is your Care EveryWhere ID. This could be used by other organizations to access your [facility] medical records  [id number]        Your Vitals Were     Pulse Last Period BMI (Body Mass Index)             84 03/26/2018 20.87 kg/m2          Blood Pressure from Last 3 Encounters:   04/09/18 102/54   03/21/18 98/60    02/05/18 98/62    Weight from Last 3 Encounters:   04/09/18 125 lb 6.4 oz (56.9 kg)   03/21/18 124 lb 12.8 oz (56.6 kg)   02/05/18 124 lb 8 oz (56.5 kg)              We Performed the Following     HUMAN PAPILLOMA VIRUS (GARDASIL 9) VACCINE     TDAP VACCINE (ADACEL)          Today's Medication Changes          These changes are accurate as of 4/9/18 11:59 PM.  If you have any questions, ask your nurse or doctor.               Start taking these medicines.        Dose/Directions    drospirenone-ethinyl estradiol 3-0.03 MG per tablet   Commonly known as:  JORGE   Used for:  Excessive or frequent menstruation, Dysmenorrhea   Started by:  Damaso Mendez MD        Dose:  1 tablet   Take 1 tablet by mouth daily   Quantity:  84 tablet   Refills:  3            Where to get your medicines      These medications were sent to [id number] - [place], [place] - [address]  AT [address]  [facility] [address] , [place] [address]     Phone:  [phone]     drospirenone-ethinyl estradiol 3-0.03 MG per tablet                Primary Care Provider Office Phone # Fax #    Price Mcclellan [phone] [address] [facility] [address]        Equal Access to Services     IMELDA GILES : Hadumesh richmond hadzaido Soangela, waaxda luqadaha, qaybta kaalmada desi, annette singh. So [facility] [phone].    ATENCIÓN: Si habla español, tiene a crocker disposición servicios gratuitos de asistencia lingüística. Rosangela al [phone].    We comply with applicable federal civil rights laws and [place] laws. We do not discriminate on the basis of race, color, national origin, age, disability, sex, sexual orientation, or gender identity.            Thank you!     Thank you for choosing [facility]  for your care. Our goal is always to provide you with excellent care. Hearing back from our patients is one way we can continue to  improve our services. Please take a few minutes to complete the written survey that you may receive in the mail after your visit with us. Thank you!             Your Updated Medication List - Protect others around you: Learn how to safely use, store and throw away your medicines at www.disposemymeds.org.          This list is accurate as of 4/9/18 11:59 PM.  Always use your most recent med list.                   Brand Name Dispense Instructions for use Diagnosis    drospirenone-ethinyl estradiol 3-0.03 MG per tablet    JORGE    84 tablet    Take 1 tablet by mouth daily    Excessive or frequent menstruation, Dysmenorrhea       ibuprofen 600 MG tablet    ADVIL/MOTRIN    90 tablet    Take 1 tablet (600 mg) by mouth every 6 hours as needed for moderate pain    Pelvic pain in female

## 2018-04-10 PROBLEM — N92.0 EXCESSIVE OR FREQUENT MENSTRUATION: Status: ACTIVE | Noted: 2018-04-10

## 2018-04-10 PROBLEM — N83.202 CYST OF LEFT OVARY: Status: RESOLVED | Noted: 2018-02-05 | Resolved: 2018-04-10

## 2018-04-10 PROBLEM — N94.6 DYSMENORRHEA: Status: ACTIVE | Noted: 2018-04-10

## 2018-04-10 NOTE — PROGRESS NOTES
SUBJECTIVE:  Elle Elizondo is an 22 year old  P0 woman who presents for evaluation of abnormal bleeding and painful menses      -menarche; age 14          -menses irregular 25-30 days; 3-6 days of flow          -progressively more painful over the last several years; unable to work or perform normal daily activities              -during menses              Has used ibuprofen to control pain; but now not sufficiently able to control pain    Not trying to be pregnant  Has not used OCP    History of ovarian cyst; complex hemorrhagic; now resolved            Past Medical History:   Diagnosis Date     NO ACTIVE PROBLEMS      Past Surgical History:   Procedure Laterality Date     APPENDECTOMY  2016     Current Outpatient Prescriptions   Medication     drospirenone-ethinyl estradiol (JORGE) 3-0.03 MG per tablet     ibuprofen (ADVIL/MOTRIN) 600 MG tablet     No current facility-administered medications for this visit.      No Known Allergies  Social History   Substance Use Topics     Smoking status: Never Smoker     Smokeless tobacco: Never Used     Alcohol use No       Review of Systems  CONSTITUTIONAL:NEGATIVE  EYES: NEGATIVE  ENT/MOUTH: NEGATIVE  RESP: NEGATIVE  CV: NEGATIVE  GI: NEGATIVE  : NEGATIVE  MUSCULOSKELATAL: NEGATIVE  INTEGUMENTARY/SKIN: NEGATIVE  BREAST: NEGATIVE  NEURO: NEGATIVE  ENDOCRINE: NEGATIVE  HEME/ALLERGY/IMMUNE: NEGATIVE  PSYCHIATRIC: NEGATIVE    OBJECTIVE:  /54  Pulse 84  Wt 125 lb 6.4 oz (56.9 kg)  LMP 2018  BMI 20.87 kg/m2   EXAM:  GENERAL APPEARANCE: healthy, alert and no distress  ABDOMEN: soft, nontender, without hepatosplenomegaly or masses        ASSESSMENT:  Menorrhagia/dysmenorrhea      Pathophysiology and differential diagnosis reviewed (PALM-COEIN) over 30 minutes      Recommend initiation of therapy with OCP; though in view of sonographic findings         -bleeding and pain may be related to endometriosis    PLAN:  (N92.0) Excessive or frequent menstruation   (primary encounter diagnosis)  Plan: drospirenone-ethinyl estradiol (JORGE) 3-0.03         MG per tablet          (Z23) Need for Tdap vaccination  Plan: TDAP VACCINE (ADACEL)       (Z23) Need for HPV vaccination  Plan: HUMAN PAPILLOMA VIRUS (GARDASIL 9) VACCINE         (N94.6) Dysmenorrhea  Plan: drospirenone-ethinyl estradiol (JORGE) 3-0.03         MG per tablet          Health Maintenance   Topic Date Due     HPV IMMUNIZATION (2 of 3 - Female 3 Dose Series) 06/04/2018     INFLUENZA VACCINE (SYSTEM ASSIGNED)  09/01/2018     PEDS DTAP/TDAP (3 - Td) 10/09/2018     CHLAMYDIA SCREENING  01/17/2019     TETANUS IMMUNIZATION (SYSTEM ASSIGNED)  04/09/2028

## 2018-05-08 ENCOUNTER — OFFICE VISIT (OUTPATIENT)
Dept: PEDIATRICS | Facility: CLINIC | Age: 23
End: 2018-05-08
Payer: COMMERCIAL

## 2018-05-08 VITALS
DIASTOLIC BLOOD PRESSURE: 56 MMHG | BODY MASS INDEX: 20.61 KG/M2 | HEART RATE: 103 BPM | TEMPERATURE: 98 F | OXYGEN SATURATION: 97 % | HEIGHT: 65 IN | SYSTOLIC BLOOD PRESSURE: 104 MMHG | WEIGHT: 123.7 LBS

## 2018-05-08 DIAGNOSIS — M26.609 TEMPOROMANDIBULAR JOINT DISORDER: Primary | ICD-10-CM

## 2018-05-08 PROCEDURE — 99214 OFFICE O/P EST MOD 30 MIN: CPT | Performed by: PHYSICIAN ASSISTANT

## 2018-05-08 RX ORDER — IBUPROFEN 600 MG/1
600 TABLET, FILM COATED ORAL EVERY 6 HOURS PRN
Qty: 15 TABLET | Refills: 0 | Status: SHIPPED | OUTPATIENT
Start: 2018-05-08 | End: 2018-06-25 | Stop reason: ALTCHOICE

## 2018-05-08 NOTE — MR AVS SNAPSHOT
After Visit Summary   5/8/2018    Elle Elizondo    MRN: [id number]           Patient Information     Date Of Birth          1995        Visit Information        Provider Department      5/8/2018 4:10 PM Neil Schwarz PA-C [facility]        Care Instructions                   Temporomandibular Joint Disorder (TMJ Disorder)          What is temporomandibular joint disorder?   Temporomandibular joint disorder (TMJ disorder) is a condition that causes frequent pain in the jaw joint. The pain occurs where the jaw meets the skull, just in front of the ear on each side of the face. Another term for this disorder is myofascial pain dysfunction of the jaw.   TMJ disorder is more common in women than men.   How does it occur?   The cause of TMJ disorder is usually not known, but causes can include:   Frequent clenching of the jaw or grinding of the teeth (the most common cause). You may clench your jaws or grind your teeth when you are feeling stressed or when you are sleeping. If you do it mainly when you are sleeping, you may not even know you are doing it.   Ill-fitting dentures.   Frequent chewing of gum or ice.   Physical or dental abnormalities, such as problems of teeth alignment.   Injury from, for example, prolonged or repeated opening of the jaw or a direct blow to the joint. Pain from the injury may seem to go away after just a short time, but months to years later painful traumatic arthritis may develop in the joint.   Other forms of arthritis in the jaw, such as rheumatoid arthritis or osteoarthritis.   What are the symptoms?   The most common symptom is pain in the jaw joint. The pain is usually dull but sometimes sharp. In most cases the pain is worse when you move your jaw, especially when you are chewing. If you are grinding your teeth at night, the pain may also be worse first thing in the morning.   Other possible symptoms are:   clicking, popping, or  grating sounds when you move your jaw   trouble completely opening your jaw or an uncomfortable bite   headache   ear pain or earache.   The painful symptoms of TMJ disorder can be similar to the symptoms of other conditions, such as ear problems. For this reason, you should see your healthcare provider about the pain.   How is it diagnosed?   Your healthcare provider will want to know when your jaw hurts and how long it has been hurting. He or she will ask if your jaw has been injured or if you have had dental work recently.   Your healthcare provider will examine your jaw for tenderness and check how it moves. An X-ray may be taken.   How is it treated?   To help relieve your symptoms:   Avoid overusing your jaw. Rest your jaw by eating only soft food. Do not chew gum or ice.   Try not to clench your jaw or grind your teeth. Your healthcare provider may recommend a bite block (also called a ), which is a plastic mouthpiece that stops the teeth from grinding together. Bite blocks are usually worn only at night.   Your dentist may make a hard splint for you to wear during the day to keep your jaw from closing completely.   Put a warm, moist washcloth on your jaw for 20 minutes, 4 to 8 times a day.   Massage the joint by pressing gently with your fingertips and moving them in a circular direction.   Put a cloth-covered ice pack on your jaw for 20 minutes 4 to 8 times a day.   Ask your healthcare provider about taking an anti-inflammatory medicine, such as ibuprofen, to help the joint become less irritated. Nonsteroidal anti-inflammatory medicines (NSAIDs) may cause stomach bleeding and other problems. These risks increase with age. Read the label and take as directed. Unless recommended by your healthcare provider, do not take for more than 10 days for any reason.   In some cases your provider may recommend a shot of steroid or cortisone in the joint to treat the inflammation.   Other treatments may  include taking muscle relaxants for a few days, using relaxation techniques, and learning ways to have less stress. Your healthcare provider may refer you to a physical therapist for treatment, such as massage and exercises that gently stretch the muscles and help with relaxation. If your pain is clearly related to stress, counseling and medicine can help.   If there is a problem with the way your teeth fit together when you bite, you may need to see a dentist.   Surgery is rarely necessary. Before you have jaw surgery, get a second opinion, preferably from a healthcare provider or dentist who has a lot of experience with this problem.   How can I help prevent TMJ disorder?   Because the cause of TMJ disorder is not known, healthcare providers do not know how to prevent it. But the following may help:   Avoid overusing your jaw (for example, avoid chewing gum or ice).   Try not to grind your teeth.   See your dentist for treatment of teeth that are not aligned well.     Published by [employer].  This content is reviewed periodically and is subject to change as new health information becomes available. The information is intended to inform and educate and is not a replacement for medical evaluation, advice, diagnosis or treatment by a healthcare professional.   Developed by [employer].   ? 2010 [employer] and/or its affiliates. All Rights Reserved.   Copyright   [employer] 2011                  Follow-ups after your visit        Who to contact     If you have questions or need follow up information about today's clinic visit or your schedule please contact [facility] directly at [phone].  Normal or non-critical lab and imaging results will be communicated to you by MyChart, letter or phone within 4 business days after the clinic has received the results. If you do not hear from us within 7 days, please contact the clinic through MyChart or phone. If you have a critical or abnormal  "lab result, we will notify you by phone as soon as possible.  Submit refill requests through [employer] or call your pharmacy and they will forward the refill request to us. Please allow 3 business days for your refill to be completed.          Additional Information About Your Visit        [employer] Information     [employer] gives you secure access to your electronic health record. If you see a primary care provider, you can also send messages to your care team and make appointments. If you have questions, please call your primary care clinic.  If you do not have a primary care provider, please call [phone] and they will assist you.        Care EveryWhere ID     This is your Care EveryWhere ID. This could be used by other organizations to access your [facility] medical records  [id number]        Your Vitals Were     Pulse Temperature Height Pulse Oximetry BMI (Body Mass Index)       103 98  F (36.7  C) (Oral) 5' 5\" (1.651 m) 97% 20.58 kg/m2        Blood Pressure from Last 3 Encounters:   05/08/18 104/56   04/09/18 102/54   03/21/18 98/60    Weight from Last 3 Encounters:   05/08/18 123 lb 11.2 oz (56.1 kg)   04/09/18 125 lb 6.4 oz (56.9 kg)   03/21/18 124 lb 12.8 oz (56.6 kg)              Today, you had the following     No orders found for display       Primary Care Provider Office Phone # Fax #    Price Mcclellan [phone]       [facility] [facility] [address]        Equal Access to Services     [facility] : Hadii jessenia richmond hadasho Socarlosali, waaxda luqadaha, qaybta kaalmada adeegyalatoya, annette singh. So [facility] [phone].    ATENCIÓN: Si habla español, tiene a crocker disposición servicios gratuitos de asistencia lingüística. Llame al [phone].    We comply with applicable federal civil rights laws and [place] laws. We do not discriminate on the basis of race, color, national origin, age, disability, sex, sexual orientation, or gender identity.       "      Thank you!     Thank you for choosing [facility]  for your care. Our goal is always to provide you with excellent care. Hearing back from our patients is one way we can continue to improve our services. Please take a few minutes to complete the written survey that you may receive in the mail after your visit with us. Thank you!             Your Updated Medication List - Protect others around you: Learn how to safely use, store and throw away your medicines at www.disposemymeds.org.          This list is accurate as of 5/8/18  4:34 PM.  Always use your most recent med list.                   Brand Name Dispense Instructions for use Diagnosis    drospirenone-ethinyl estradiol 3-0.03 MG per tablet    JORGE    84 tablet    Take 1 tablet by mouth daily    Excessive or frequent menstruation, Dysmenorrhea       ibuprofen 600 MG tablet    ADVIL/MOTRIN    90 tablet    Take 1 tablet (600 mg) by mouth every 6 hours as needed for moderate pain    Pelvic pain in female

## 2018-05-08 NOTE — PATIENT INSTRUCTIONS
Temporomandibular Joint Disorder (TMJ Disorder)          What is temporomandibular joint disorder?   Temporomandibular joint disorder (TMJ disorder) is a condition that causes frequent pain in the jaw joint. The pain occurs where the jaw meets the skull, just in front of the ear on each side of the face. Another term for this disorder is myofascial pain dysfunction of the jaw.   TMJ disorder is more common in women than men.   How does it occur?   The cause of TMJ disorder is usually not known, but causes can include:   Frequent clenching of the jaw or grinding of the teeth (the most common cause). You may clench your jaws or grind your teeth when you are feeling stressed or when you are sleeping. If you do it mainly when you are sleeping, you may not even know you are doing it.   Ill-fitting dentures.   Frequent chewing of gum or ice.   Physical or dental abnormalities, such as problems of teeth alignment.   Injury from, for example, prolonged or repeated opening of the jaw or a direct blow to the joint. Pain from the injury may seem to go away after just a short time, but months to years later painful traumatic arthritis may develop in the joint.   Other forms of arthritis in the jaw, such as rheumatoid arthritis or osteoarthritis.   What are the symptoms?   The most common symptom is pain in the jaw joint. The pain is usually dull but sometimes sharp. In most cases the pain is worse when you move your jaw, especially when you are chewing. If you are grinding your teeth at night, the pain may also be worse first thing in the morning.   Other possible symptoms are:   clicking, popping, or grating sounds when you move your jaw   trouble completely opening your jaw or an uncomfortable bite   headache   ear pain or earache.   The painful symptoms of TMJ disorder can be similar to the symptoms of other conditions, such as ear problems. For this reason, you should see your healthcare provider about the  pain.   How is it diagnosed?   Your healthcare provider will want to know when your jaw hurts and how long it has been hurting. He or she will ask if your jaw has been injured or if you have had dental work recently.   Your healthcare provider will examine your jaw for tenderness and check how it moves. An X-ray may be taken.   How is it treated?   To help relieve your symptoms:   Avoid overusing your jaw. Rest your jaw by eating only soft food. Do not chew gum or ice.   Try not to clench your jaw or grind your teeth. Your healthcare provider may recommend a bite block (also called a ), which is a plastic mouthpiece that stops the teeth from grinding together. Bite blocks are usually worn only at night.   Your dentist may make a hard splint for you to wear during the day to keep your jaw from closing completely.   Put a warm, moist washcloth on your jaw for 20 minutes, 4 to 8 times a day.   Massage the joint by pressing gently with your fingertips and moving them in a circular direction.   Put a cloth-covered ice pack on your jaw for 20 minutes 4 to 8 times a day.   Ask your healthcare provider about taking an anti-inflammatory medicine, such as ibuprofen, to help the joint become less irritated. Nonsteroidal anti-inflammatory medicines (NSAIDs) may cause stomach bleeding and other problems. These risks increase with age. Read the label and take as directed. Unless recommended by your healthcare provider, do not take for more than 10 days for any reason.   In some cases your provider may recommend a shot of steroid or cortisone in the joint to treat the inflammation.   Other treatments may include taking muscle relaxants for a few days, using relaxation techniques, and learning ways to have less stress. Your healthcare provider may refer you to a physical therapist for treatment, such as massage and exercises that gently stretch the muscles and help with relaxation. If your pain is clearly related to  stress, counseling and medicine can help.   If there is a problem with the way your teeth fit together when you bite, you may need to see a dentist.   Surgery is rarely necessary. Before you have jaw surgery, get a second opinion, preferably from a healthcare provider or dentist who has a lot of experience with this problem.   How can I help prevent TMJ disorder?   Because the cause of TMJ disorder is not known, healthcare providers do not know how to prevent it. But the following may help:   Avoid overusing your jaw (for example, avoid chewing gum or ice).   Try not to grind your teeth.   See your dentist for treatment of teeth that are not aligned well.     Published by [employer]  This content is reviewed periodically and is subject to change as new health information becomes available. The information is intended to inform and educate and is not a replacement for medical evaluation, advice, diagnosis or treatment by a healthcare professional.   Developed by [employer]   ? 2010 [employer] and/or its affiliates. All Rights Reserved.   Copyright   [employer] 2011

## 2018-05-08 NOTE — PROGRESS NOTES
"  SUBJECTIVE:   Elle Elizondo is a 22 year old female who presents to clinic today for the following health issues:    Acute Illness   Acute illness concerns: ear  Onset: 3 days ago    Fever: no    Chills/Sweats: no    Headache (location?): YES- left side--in front    Sinus Pressure:no    Conjunctivitis:  no    Ear Pain: YES: left    Rhinorrhea: no    Congestion: no    Sore Throat: no     Cough: no    Wheeze: no    Decreased Appetite: no    Nausea: no    Vomiting: no    Diarrhea:  no    Dysuria/Freq.: no    Fatigue/Achiness: no    Sick/Strep Exposure: no     Therapies Tried and outcome: Ibuprofen  No recent dental procedures.   Vaccinations UTD--mumps.   ROS:  ROS otherwise negative    OBJECTIVE:                                                    /56 (BP Location: Right arm, Cuff Size: Adult Regular)  Pulse 103  Temp 98  F (36.7  C) (Oral)  Ht 5' 5\" (1.651 m)  Wt 123 lb 11.2 oz (56.1 kg)  SpO2 97%  BMI 20.58 kg/m2  Body mass index is 20.58 kg/(m^2).   GENERAL: alert, no distress  HENT: ear canals- normal; TMs- normal; Nose- normal; Mouth- no ulcers, no lesions; POSITIVE for left TMJ pain  NECK: no tenderness, no adenopathy  RESP: lungs clear to auscultation - no rales, no rhonchi, no wheezes  CV: regular rates and rhythm, normal S1 S2, no S3 or S4 and no murmur, no click or rub    Diagnostic test results:  No results found for this or any previous visit (from the past 24 hour(s)).     ASSESSMENT/PLAN:                                                    (M26.593) Temporomandibular joint disorder  (primary encounter diagnosis)  Comment: patient requests ibu 600mg tabs, proceed with rest, heat, massage.   Plan: ibuprofen (ADVIL/MOTRIN) 600 MG tablet          See Patient Instructions    Neil Schwarz PA-C  [facility]  "

## 2018-06-25 ENCOUNTER — OFFICE VISIT (OUTPATIENT)
Dept: PEDIATRICS | Facility: CLINIC | Age: 23
End: 2018-06-25
Payer: COMMERCIAL

## 2018-06-25 VITALS
SYSTOLIC BLOOD PRESSURE: 100 MMHG | WEIGHT: 118.1 LBS | TEMPERATURE: 99 F | OXYGEN SATURATION: 98 % | DIASTOLIC BLOOD PRESSURE: 62 MMHG | HEART RATE: 100 BPM | BODY MASS INDEX: 19.68 KG/M2 | HEIGHT: 65 IN

## 2018-06-25 DIAGNOSIS — L30.9 FACIAL DERMATITIS: Primary | ICD-10-CM

## 2018-06-25 DIAGNOSIS — R11.0 NAUSEA: ICD-10-CM

## 2018-06-25 DIAGNOSIS — R42 DIZZINESS: ICD-10-CM

## 2018-06-25 DIAGNOSIS — R19.09 LUMP IN THE GROIN: ICD-10-CM

## 2018-06-25 LAB
BASOPHILS # BLD AUTO: 0 10E9/L (ref 0–0.2)
BASOPHILS NFR BLD AUTO: 0.1 %
BETA HCG QUAL IFA URINE: NEGATIVE
DIFFERENTIAL METHOD BLD: NORMAL
EOSINOPHIL # BLD AUTO: 0 10E9/L (ref 0–0.7)
EOSINOPHIL NFR BLD AUTO: 0.2 %
ERYTHROCYTE [DISTWIDTH] IN BLOOD BY AUTOMATED COUNT: 12.2 % (ref 10–15)
HCT VFR BLD AUTO: 40.6 % (ref 35–47)
HGB BLD-MCNC: 13.9 G/DL (ref 11.7–15.7)
LYMPHOCYTES # BLD AUTO: 2.5 10E9/L (ref 0.8–5.3)
LYMPHOCYTES NFR BLD AUTO: 28.1 %
MCH RBC QN AUTO: 29.9 PG (ref 26.5–33)
MCHC RBC AUTO-ENTMCNC: 34.2 G/DL (ref 31.5–36.5)
MCV RBC AUTO: 87 FL (ref 78–100)
MONOCYTES # BLD AUTO: 0.8 10E9/L (ref 0–1.3)
MONOCYTES NFR BLD AUTO: 8.5 %
NEUTROPHILS # BLD AUTO: 5.6 10E9/L (ref 1.6–8.3)
NEUTROPHILS NFR BLD AUTO: 63.1 %
PLATELET # BLD AUTO: 273 10E9/L (ref 150–450)
RBC # BLD AUTO: 4.65 10E12/L (ref 3.8–5.2)
WBC # BLD AUTO: 8.8 10E9/L (ref 4–11)

## 2018-06-25 PROCEDURE — 84703 CHORIONIC GONADOTROPIN ASSAY: CPT | Performed by: NURSE PRACTITIONER

## 2018-06-25 PROCEDURE — 36415 COLL VENOUS BLD VENIPUNCTURE: CPT | Performed by: NURSE PRACTITIONER

## 2018-06-25 PROCEDURE — 99214 OFFICE O/P EST MOD 30 MIN: CPT | Performed by: NURSE PRACTITIONER

## 2018-06-25 PROCEDURE — 84443 ASSAY THYROID STIM HORMONE: CPT | Performed by: NURSE PRACTITIONER

## 2018-06-25 PROCEDURE — 80048 BASIC METABOLIC PNL TOTAL CA: CPT | Performed by: NURSE PRACTITIONER

## 2018-06-25 PROCEDURE — 85025 COMPLETE CBC W/AUTO DIFF WBC: CPT | Performed by: NURSE PRACTITIONER

## 2018-06-25 RX ORDER — HYDROCORTISONE VALERATE CREAM 2 MG/G
CREAM TOPICAL
Qty: 45 G | Refills: 0 | Status: SHIPPED | OUTPATIENT
Start: 2018-06-25

## 2018-06-25 NOTE — PATIENT INSTRUCTIONS
For the face:   -Take a picture of it when it's bad  -Ok to use steroid cream three times a day for no more than 2 weeks at a time. Taking it for too long can cause the skin to thin  -If it comes back please call our clinic to schedule with dermatology. The referral has been placed.    For the lump in the groin:  -Take a picture if it comes back  -Come in immediately if it comes back so we can evaluate it when its bad.

## 2018-06-25 NOTE — PROGRESS NOTES
"  SUBJECTIVE:   Elle Elizondo is a 22 year old female who presents to clinic today for the following health issues:    Derm Problem  Skin Changes      Duration: 2 months    Description (location/character/radiation): bikini line - skin is blackened, bumpy and painful - waxing and waning in size, denies itching    Intensity:  moderate    Accompanying signs and symptoms: also having facial skin problems, very dry,  on right cheek area by nose - getting gbigger    History (similar episodes/previous evaluation): None    Precipitating or alleviating factors: None    Therapies tried and outcome: None: Symptoms not alleviated       States she has a few lesions on her face that are raised, round and flaky. States she was given a steroid cream (she took a photograph) and lesions wet away several years ago. No history of lupus or other autoimmune disease.     Also states she had a lump in her groin area for a few days that has since gone away. It was soft and tender. It was black and blue. She shaves regularly.  had the same thing.    States that, for the last 2-3 days she has been nauseated but not vomited, although she has felt like it. She has also felt dizzy, mostly with sitting or standing too quickly. Not with position changes. No signs of temperature intolerance, dairrhea, etc. No heart palpitations, chest pain, or shortness of breath. NO diarrhea, abdominal pain, or other stool changes. Thinks she drinks plenty of water.     ROS: const/heent/derm/gi/gu/cv/resp otherwise negative     OBJECTIVE:  /62 (BP Location: Right arm, Cuff Size: Adult Regular)  Pulse 100  Temp 99  F (37.2  C) (Tympanic)  Ht 5' 5\" (1.651 m)  Wt 118 lb 1.6 oz (53.6 kg)  SpO2 98%  BMI 19.65 kg/m2  CONSTITUTIONAL: Alert, well-nourished, well-groomed, NAD  RESP: Lungs CTA. No wheeze, rhonchi, rales.  CV: HRRR S1 S2 No MRG. No peripheral edema  HEENT: Eyes: Conjunctiva pink and moist. Ears: Ear canals unremarkable. TMs pearly " gray bilaterally. Bony landmarks and light reflexes intact. No erythema. Nose: Turbinates pink and moist. Throat: OP pink and moist. No tonsillar enlargement or exudates. No postnasal drip.  DERM: Face with thick makeup. Partially rubbed off with alcohol there is a slightly flaky area of skin, about 5mm x 5mm without surrounding erythema on her right cheek about an inch to the right of her nose. Scant papular acne over cheeks as well.  GROIN: Unremarkable other than normal, very small non-tender mobile lymph nodes. No discoloration.     ASSESSMENT/PLAN:  (L30.9) Facial dermatitis  (primary encounter diagnosis)  Comment: Very difficult to assess given patient's thick makeup. However, it appears to be scaly and dry without surrounding erythema. Not characteristic of butterfly rash or acne. No central clearing to suggest ringworm. Possible nummular eczema. Since steroid cream worked for her in the past will try short course of that.  Plan: hydrocortisone (WESTCORT) 0.2 % cream,         DERMATOLOGY REFERRAL          -Consider going without makeup then re-trialing each makeup to see if anything triggers these sx  -Apply cream sparingly BID. No longer than 2 weeks. Discussed r/b/se.  -Moisturize well.   -If sx return she should see derm. Referral placed.     (R19.09) Lump in the groin  Comment: Not visible today. However, patient states she had several days of about a 1cm x 1cm soft, fluctuant, bruised appearing mass in her right groin, which her  also had. States his was treated with antibiotics. She has no red flags such as fever, weight loss, night sweats. DDX includes inflamed/infected lymph node possibly due to shaving, ingrown hair, lymphoma, etc.   Plan:   -Monitor sx  -Come in immediately if it happens again so we can evaluate it at that time    (R11.0) Nausea  Comment: See above for history. Non-specific symptoms of some orthostatic dizziness, fatigue, and nausea. UPT negative. No reason for anemia. DDX  includes anemia, hyperthyroidism, electrolyte derangement, viral gastroenteritis. No abdominal pain to suggest ulcer, cholecystitis, etc.   Plan: CBC with platelets differential, TSH with free         T4 reflex, Basic metabolic panel, Beta HCG qual        IFA urine          -Will monitor sx for now  -Continue small, frequent meals. Stay hydrated  -Discussed supportive cares and reasons to return. Discussed reasons to seek care urgently.       (R42) Dizziness  Plan: CBC with platelets differential, TSH with free         T4 reflex, Basic metabolic panel, Beta HCG qual        IFA urine              Corazon Liliam, FNP-DNP.

## 2018-06-25 NOTE — MR AVS SNAPSHOT
After Visit Summary   6/25/2018    Elle Elizondo    MRN: [id number]           Patient Information     Date Of Birth          1995        Visit Information        Provider Department      6/25/2018 1:20 PM Corazon Ricketts APRN CNP [facility]        Today's Diagnoses     Facial dermatitis    -  1    Lump in the groin          Care Instructions    For the face:   -Take a picture of it when it's bad  -Ok to use steroid cream three times a day for no more than 2 weeks at a time. Taking it for too long can cause the skin to thin  -If it comes back please call our clinic to schedule with dermatology. The referral has been placed.    For the lump in the groin:  -Take a picture if it comes back  -Come in immediately if it comes back so we can evaluate it when its bad.          Follow-ups after your visit        Additional Services     DERMATOLOGY REFERRAL       Your provider has referred you to: [facility]: [facility] - [place] [phone]    Please be aware that coverage of these services is subject to the terms and limitations of your health insurance plan.  Call member services at your health plan with any benefit or coverage questions.      Please bring the following with you to your appointment:    (1) Any X-Rays, CTs or MRIs which have been performed.  Contact the facility where they were done to arrange for  prior to your scheduled appointment.    (2) List of current medications  (3) This referral request   (4) Any documents/labs given to you for this referral                  Who to contact     If you have questions or need follow up information about today's clinic visit or your schedule please contact [facility] [place] directly at [phone].  Normal or non-critical lab and imaging results will be communicated to you by MyChart, letter or phone within 4 business days after the clinic has received the results. If you do not hear from us within 7  "days, please contact the clinic through [employer] or phone. If you have a critical or abnormal lab result, we will notify you by phone as soon as possible.  Submit refill requests through [employer] or call your pharmacy and they will forward the refill request to us. Please allow 3 business days for your refill to be completed.          Additional Information About Your Visit        [employer] Information     [employer] gives you secure access to your electronic health record. If you see a primary care provider, you can also send messages to your care team and make appointments. If you have questions, please call your primary care clinic.  If you do not have a primary care provider, please call [phone] and they will assist you.        Care EveryWhere ID     This is your Care EveryWhere ID. This could be used by other organizations to access your [place] medical records  [id number]        Your Vitals Were     Pulse Temperature Height Pulse Oximetry BMI (Body Mass Index)       130 99  F (37.2  C) (Tympanic) 5' 5\" (1.651 m) 98% 19.65 kg/m2        Blood Pressure from Last 3 Encounters:   06/25/18 100/62   05/08/18 104/56   04/09/18 102/54    Weight from Last 3 Encounters:   06/25/18 118 lb 1.6 oz (53.6 kg)   05/08/18 123 lb 11.2 oz (56.1 kg)   04/09/18 125 lb 6.4 oz (56.9 kg)              We Performed the Following     DERMATOLOGY REFERRAL          Today's Medication Changes          These changes are accurate as of 6/25/18  1:41 PM.  If you have any questions, ask your nurse or doctor.               Start taking these medicines.        Dose/Directions    hydrocortisone 0.2 % cream   Commonly known as:  WESTCORT   Used for:  Facial dermatitis   Started by:  Corazon Ricketts APRN CNP        Apply sparingly to affected area three times daily as needed.   Quantity:  45 g   Refills:  0         These medicines have changed or have updated prescriptions.        Dose/Directions    ibuprofen 600 MG tablet   Commonly known " as:  ADVIL/MOTRIN   This may have changed:  Another medication with the same name was removed. Continue taking this medication, and follow the directions you see here.   Used for:  Pelvic pain in female   Changed by:  Corazon Ricketts APRN CNP        Dose:  600 mg   Take 1 tablet (600 mg) by mouth every 6 hours as needed for moderate pain   Quantity:  90 tablet   Refills:  1            Where to get your medicines      These medications were sent to [facility] [place] - [facility] - [address]   [address]  [address], [place] [address]     Phone:  [phone]     hydrocortisone 0.2 % cream                Primary Care Provider Office Phone # Fax #    Price Mcclellan [phone] [address] [address]        Equal Access to Services     [facility]: Hadii jessenia richmond hadasho Soangela, waaxda luqadaha, qaybta kaalmada adeegyada, waxisaak parikh . So [facility] [phone].    ATENCIÓN: Si habla español, tiene a crocker disposición servicios gratuitos de asistencia lingüística. Rosangela al [phone].    We comply with applicable federal civil rights laws and [place] laws. We do not discriminate on the basis of race, color, national origin, age, disability, sex, sexual orientation, or gender identity.            Thank you!     Thank you for choosing [facility]  for your care. Our goal is always to provide you with excellent care. Hearing back from our patients is one way we can continue to improve our services. Please take a few minutes to complete the written survey that you may receive in the mail after your visit with us. Thank you!             Your Updated Medication List - Protect others around you: Learn how to safely use, store and throw away your medicines at www.disposemymeds.org.          This list is accurate as of 6/25/18  1:41 PM.  Always use your most recent med list.                    Brand Name Dispense Instructions for use Diagnosis    drospirenone-ethinyl estradiol 3-0.03 MG per tablet    JORGE    84 tablet    Take 1 tablet by mouth daily    Excessive or frequent menstruation, Dysmenorrhea       hydrocortisone 0.2 % cream    WESTCORT    45 g    Apply sparingly to affected area three times daily as needed.    Facial dermatitis       ibuprofen 600 MG tablet    ADVIL/MOTRIN    90 tablet    Take 1 tablet (600 mg) by mouth every 6 hours as needed for moderate pain    Pelvic pain in female

## 2018-06-26 LAB
ANION GAP SERPL CALCULATED.3IONS-SCNC: 7 MMOL/L (ref 3–14)
BUN SERPL-MCNC: 12 MG/DL (ref 7–30)
CALCIUM SERPL-MCNC: 9.4 MG/DL (ref 8.5–10.1)
CHLORIDE SERPL-SCNC: 105 MMOL/L (ref 94–109)
CO2 SERPL-SCNC: 26 MMOL/L (ref 20–32)
CREAT SERPL-MCNC: 0.76 MG/DL (ref 0.52–1.04)
GFR SERPL CREATININE-BSD FRML MDRD: >90 ML/MIN/1.7M2
GLUCOSE SERPL-MCNC: 98 MG/DL (ref 70–99)
POTASSIUM SERPL-SCNC: 3.9 MMOL/L (ref 3.4–5.3)
SODIUM SERPL-SCNC: 138 MMOL/L (ref 133–144)
TSH SERPL DL<=0.005 MIU/L-ACNC: 1.54 MU/L (ref 0.4–4)

## 2018-07-23 ENCOUNTER — OFFICE VISIT (OUTPATIENT)
Dept: PEDIATRICS | Facility: CLINIC | Age: 23
End: 2018-07-23
Payer: COMMERCIAL

## 2018-07-23 ENCOUNTER — PATIENT OUTREACH (OUTPATIENT)
Dept: CARE COORDINATION | Facility: CLINIC | Age: 23
End: 2018-07-23

## 2018-07-23 VITALS
SYSTOLIC BLOOD PRESSURE: 106 MMHG | OXYGEN SATURATION: 98 % | WEIGHT: 117.5 LBS | HEIGHT: 65 IN | DIASTOLIC BLOOD PRESSURE: 62 MMHG | TEMPERATURE: 98.5 F | HEART RATE: 104 BPM | BODY MASS INDEX: 19.58 KG/M2

## 2018-07-23 DIAGNOSIS — Z11.3 ROUTINE SCREENING FOR STI (SEXUALLY TRANSMITTED INFECTION): ICD-10-CM

## 2018-07-23 DIAGNOSIS — R10.2 PELVIC PAIN IN FEMALE: Primary | ICD-10-CM

## 2018-07-23 DIAGNOSIS — F43.9 STRESS AT HOME: ICD-10-CM

## 2018-07-23 DIAGNOSIS — N94.6 DYSMENORRHEA: ICD-10-CM

## 2018-07-23 DIAGNOSIS — N92.0 EXCESSIVE OR FREQUENT MENSTRUATION: ICD-10-CM

## 2018-07-23 PROCEDURE — 87491 CHLMYD TRACH DNA AMP PROBE: CPT | Performed by: INTERNAL MEDICINE

## 2018-07-23 PROCEDURE — 36415 COLL VENOUS BLD VENIPUNCTURE: CPT | Performed by: INTERNAL MEDICINE

## 2018-07-23 PROCEDURE — 87591 N.GONORRHOEAE DNA AMP PROB: CPT | Performed by: INTERNAL MEDICINE

## 2018-07-23 PROCEDURE — 99214 OFFICE O/P EST MOD 30 MIN: CPT | Performed by: INTERNAL MEDICINE

## 2018-07-23 PROCEDURE — 87389 HIV-1 AG W/HIV-1&-2 AB AG IA: CPT | Performed by: INTERNAL MEDICINE

## 2018-07-23 PROCEDURE — 86780 TREPONEMA PALLIDUM: CPT | Performed by: INTERNAL MEDICINE

## 2018-07-23 RX ORDER — DROSPIRENONE AND ETHINYL ESTRADIOL 0.03MG-3MG
1 KIT ORAL DAILY
Qty: 84 TABLET | Refills: 3 | Status: SHIPPED | OUTPATIENT
Start: 2018-07-23 | End: 2018-08-14

## 2018-07-23 RX ORDER — IBUPROFEN 600 MG/1
600 TABLET, FILM COATED ORAL EVERY 6 HOURS PRN
Qty: 90 TABLET | Refills: 1 | Status: SHIPPED | OUTPATIENT
Start: 2018-07-23 | End: 2018-11-13

## 2018-07-23 ASSESSMENT — ACTIVITIES OF DAILY LIVING (ADL): DEPENDENT_IADLS:: INDEPENDENT

## 2018-07-23 NOTE — PROGRESS NOTES
Clinic Care Coordination Contact    Clinical Data: Care Coordinator Outreach  Outreach attempted x 1.  Spoke briefly with patient about [facility] referral. Pt reports that she is unable to speak at the time of outreach and would like to call [facility] in the next few days at a more convenient time. Provided the patient with the call back information and will await return call.  Plan: Care Coordinator will await return call or try to reach patient again in 3-5 business days.    Renuka Hollis [facility]  Clinic Care Coordinator  [phone]  [email or web address]

## 2018-07-23 NOTE — PROGRESS NOTES
SUBJECTIVE:   Elle Elizondo is a 22 year old female who presents to clinic today for the following health issues:    Pt is here today to follow up on  cyst on left ovary     ------  # Pelvic pain  # history of cyst  Saw OB-GYN and was started in [place]. Endometriosis was discussed as a possible diagnosis and that to make this diagnosis laparoscopy is needed.   She has done very well on [place]- she had no pain with her cycle, every 28 days.   Did not understand she was given refills.   Last pill was 7/15/18.     # Social  She recently found out that her  has been cheating on her for the entirety of their marriage.  He apparently had a relationship prior to them getting  which he continued with.  They are planning a divorce.  Initially she requested not to talk about this but then went into greater detail.  She is planning on returning to remaining on as soon as she can which likely will be at least another month.  She is very socially isolated and does not have any friends here.  She is able to talk to her sister on the phone who is supportive.  Her other family members have given her the impression that this is her fault.  Her  can get mad that he does not physically harm her.  She says she feels safe at home.  She is having a lot of difficulty sleeping-she feels like she is only getting about 3 hours a night.  She is not really leaving the house.    Problem list and histories reviewed & adjusted, as indicated.  Additional history: as documented    Patient Active Problem List   Diagnosis     Dysmenorrhea     Excessive or frequent menstruation     Past Surgical History:   Procedure Laterality Date     APPENDECTOMY  2016       Social History   Substance Use Topics     Smoking status: Never Smoker     Smokeless tobacco: Never Used     Alcohol use No     Family History   Problem Relation Age of Onset     Family History Negative Mother      Family History Negative Father      Family History Negative  "Brother      Family History Negative Brother      Family History Negative Brother          Current Outpatient Prescriptions   Medication Sig Dispense Refill     drospirenone-ethinyl estradiol (JORGE) 3-0.03 MG per tablet Take 1 tablet by mouth daily 84 tablet 3     ibuprofen (ADVIL/MOTRIN) 600 MG tablet Take 1 tablet (600 mg) by mouth every 6 hours as needed for moderate pain 90 tablet 1     hydrocortisone (WESTCORT) 0.2 % cream Apply sparingly to affected area three times daily as needed. (Patient not taking: Reported on 7/23/2018) 45 g 0     [DISCONTINUED] drospirenone-ethinyl estradiol (JORGE) 3-0.03 MG per tablet Take 1 tablet by mouth daily 84 tablet 3     Allergies   Allergen Reactions     Strawberry        Reviewed and updated as needed this visit by clinical staff  Tobacco  Allergies  Meds  Med Hx  Fam Hx  Soc Hx      Reviewed and updated as needed this visit by Provider         ROS:  Constitutional, HEENT, cardiovascular, pulmonary, gi and gu systems are negative, except as otherwise noted.    OBJECTIVE:     /62 (BP Location: Right arm, Patient Position: Chair, Cuff Size: Adult Regular)  Pulse 104  Temp 98.5  F (36.9  C) (Oral)  Ht 5' 5\" (1.651 m)  Wt 117 lb 8 oz (53.3 kg)  SpO2 98%  Breastfeeding? No  BMI 19.55 kg/m2  Body mass index is 19.55 kg/(m^2).  GENERAL: healthy, alert and no distress  HENT: ear canals and TM's normal, nose and mouth without ulcers or lesions  NECK: no adenopathy, no asymmetry, masses, or scars and thyroid normal to palpation  RESP: lungs clear to auscultation - no rales, rhonchi or wheezes  CV: regular rate and rhythm, normal S1 S2, no S3 or S4, no murmur, click or rub, no peripheral edema and peripheral pulses strong  ABDOMEN: soft, nontender, no hepatosplenomegaly, no masses and bowel sounds normal  MS: no gross musculoskeletal defects noted, no edema  PSYCH: mentation appears normal, tearful, no si/hi    Diagnostic Test Results:  Labs " pending.    ASSESSMENT/PLAN:       ICD-10-CM    1. Pelvic pain in female R10.2 ibuprofen (ADVIL/MOTRIN) 600 MG tablet   2. Excessive or frequent menstruation N92.0 drospirenone-ethinyl estradiol (JORGE) 3-0.03 MG per tablet   3. Dysmenorrhea N94.6 drospirenone-ethinyl estradiol (JORGE) 3-0.03 MG per tablet   4. Routine screening for STI (sexually transmitted infection) Z11.3 HIV Antigen Antibody Combo     Treponema Abs w Reflex to RPR and Titer     Neisseria gonorrhoeae PCR     Chlamydia trachomatis PCR   5. Stress at home F43.9 CARE COORDINATION REFERRAL     Dysmenorrhea responded wonderfully to OCPs.  She is no longer trying to get pregnant.  Will refill for another 3 months (she didn't realize she had refills).    Unfortunately her  has been cheating on her.  She is planning to divorce him and go back to [facility].  She has very little social support here and is quite isolated.  She does feel safe at home but I think it would be helpful to resources at her disposal should things change.  I will have my care coordinator reach out to her.  I also gave her my card and instructed her to call me if she was having any difficulties.    We talked about conservative ways of reducing stress and helping with sleep.  Typically I would refer to counseling but she thinks she will be here for less than 4 weeks. See PI for plan.    Patient Instructions   Nice to see you again!    Let's restart the birth control - this should help regulate your cycle and help with the pain. Okay to start the pills today.     Labs today - I'll send you the results via [employer].     Our Care Coordinator is going to reach out to you via phone.     For the cough   - stay hydrated  - okay to use some cough drops (any brand is fine, use them as needed, they sell them downstairs).     For sleep   - exercise helps you sleep better - make sure you're getting outside everyday.   - melatonin is a natural hormone - can take 3-5 mg a few hours before  you go to sleep. They sell this in the pharmacy.   - yoga can sometimes be helpful to calm your mind. There is free yoga on YouTube.    Call me with questions - or if things get hard at home.     Price Mcclellan MD  [facility]

## 2018-07-23 NOTE — MR AVS SNAPSHOT
After Visit Summary   7/23/2018    Elle Elizondo    MRN: [id number]           Patient Information     Date Of Birth          1995        Visit Information        Provider Department      7/23/2018 8:50 AM Price Mcc[facility]llan MD [facility]        Today's Diagnoses     Routine screening for STI (sexually transmitted infection)    -  1    Pelvic pain in female        Excessive or frequent menstruation        Dysmenorrhea          Care Instructions    Nice to see you again!    Let's restart the birth control - this should help regulate your cycle and help with the pain. Okay to start the pills today.     Labs today - I'll send you the results via [employer].     Our Care Coordinator is going to reach out to you via phone.     For the cough   - stay hydrated  - okay to use some cough drops (any brand is fine, use them as needed, they sell them downstairs).     For sleep   - exercise helps you sleep better - make sure you're getting outside everyday.   - melatonin is a natural hormone - can take 3-5 mg a few hours before you go to sleep. They sell this in the pharmacy.   - yoga can sometimes be helpful to calm your mind. There is free yoga on [employer].    Call me with questions - or if things get hard at home.           Follow-ups after your visit        Follow-up notes from your care team     Return if symptoms worsen or fail to improve.      Who to contact     If you have questions or need follow up information about today's clinic visit or your schedule please contact [facility] directly at [phone].  Normal or non-critical lab and imaging results will be communicated to you by MyChart, letter or phone within 4 business days after the clinic has received the results. If you do not hear from us within 7 days, please contact the clinic through [employer] or phone. If you have a critical or abnormal lab result, we will notify you by phone as soon as possible.  Submit  "refill requests through [email or web address] or call your pharmacy and they will forward the refill request to us. Please allow 3 business days for your refill to be completed.          Additional Information About Your Visit        [facility] Information     [email or web address] gives you secure access to your electronic health record. If you see a primary care provider, you can also send messages to your care team and make appointments. If you have questions, please call your primary care clinic.  If you do not have a primary care provider, please call [phone] and they will assist you.        Care EveryWhere ID     This is your Care EveryWhere ID. This could be used by other organizations to access your [facility] medical records  [id number]        Your Vitals Were     Pulse Temperature Height Pulse Oximetry Breastfeeding? BMI (Body Mass Index)    104 98.5  F (36.9  C) (Oral) 5' 5\" (1.651 m) 98% No 19.55 kg/m2       Blood Pressure from Last 3 Encounters:   07/23/18 106/62   06/25/18 100/62   05/08/18 104/56    Weight from Last 3 Encounters:   07/23/18 117 lb 8 oz (53.3 kg)   06/25/18 118 lb 1.6 oz (53.6 kg)   05/08/18 123 lb 11.2 oz (56.1 kg)              We Performed the Following     Chlamydia trachomatis PCR     HIV Antigen Antibody Combo     Neisseria gonorrhoeae PCR     Treponema Abs w Reflex to RPR and Titer          Where to get your medicines      These medications were sent to [facility] - [address]   [address]  [address], [place] [address]     Phone:  [phone]     drospirenone-ethinyl estradiol 3-0.03 MG per tablet    ibuprofen 600 MG tablet          Primary Care Provider Office Phone # Fax #    Price Mcclellan -[phone]       [facility] [address]        Equal Access to Services     [facility]: Hadii jessenia mtzo Soangela, waaxda luqadaha, qaybta kaalmaannette cooney. So " [facility] [phone].    ATENCIÓN: Si jes garg, tiene a crocker disposición servicios gratuitos de asistencia lingüística. Rosangela miranda [phone].    We comply with applicable federal civil rights laws and [place] laws. We do not discriminate on the basis of race, color, national origin, age, disability, sex, sexual orientation, or gender identity.            Thank you!     Thank you for choosing [facility]  for your care. Our goal is always to provide you with excellent care. Hearing back from our patients is one way we can continue to improve our services. Please take a few minutes to complete the written survey that you may receive in the mail after your visit with us. Thank you!             Your Updated Medication List - Protect others around you: Learn how to safely use, store and throw away your medicines at www.disposemymeds.org.          This list is accurate as of 7/23/18  9:21 AM.  Always use your most recent med list.                   Brand Name Dispense Instructions for use Diagnosis    drospirenone-ethinyl estradiol 3-0.03 MG per tablet    JORGE    84 tablet    Take 1 tablet by mouth daily    Excessive or frequent menstruation, Dysmenorrhea       hydrocortisone 0.2 % cream    WESTCORT    45 g    Apply sparingly to affected area three times daily as needed.    Facial dermatitis       ibuprofen 600 MG tablet    ADVIL/MOTRIN    90 tablet    Take 1 tablet (600 mg) by mouth every 6 hours as needed for moderate pain    Pelvic pain in female

## 2018-07-23 NOTE — PATIENT INSTRUCTIONS
Nice to see you again!    Let's restart the birth control - this should help regulate your cycle and help with the pain. Okay to start the pills today.     Labs today - I'll send you the results via agreement24 [email or web address].     Our Care Coordinator is going to reach out to you via phone.     For the cough   - stay hydrated  - okay to use some cough drops (any brand is fine, use them as needed, they sell them downstairs).     For sleep   - exercise helps you sleep better - make sure you're getting outside everyday.   - melatonin is a natural hormone - can take 3-5 mg a few hours before you go to sleep. They sell this in the pharmacy.   - yoga can sometimes be helpful to calm your mind. There is free yoga on YouTube.    Call me with questions - or if things get hard at home.

## 2018-07-24 LAB
C TRACH DNA SPEC QL NAA+PROBE: NEGATIVE
HIV 1+2 AB+HIV1 P24 AG SERPL QL IA: NONREACTIVE
N GONORRHOEA DNA SPEC QL NAA+PROBE: NEGATIVE
SPECIMEN SOURCE: NORMAL
SPECIMEN SOURCE: NORMAL
T PALLIDUM AB SER QL: NONREACTIVE

## 2018-07-27 NOTE — PROGRESS NOTES
Clinic Care Coordination Contact  Clinic Care Coordination Contact  OUTREACH    Referral Information:  Referral Source: PCP    Primary Diagnosis: Psychosocial    Chief Complaint   Patient presents with     Clinic Care Coordination - Initial     Relationship Concerns        Universal Utilization: No concerns noted  Utilization    Last refreshed: 7/24/2018  8:03 PM:  No Show Count (past year) 0       Last refreshed: 7/24/2018  8:03 PM:  ED visits 0       Last refreshed: 7/24/2018  8:03 PM:  Hospital admissions 0          Current as of: 7/24/2018  8:03 PM           Clinical Concerns:  Current Medical Concerns:  Pelvic pain and relationship concerns    Current Behavioral Concerns: No concerns noted    Education Provided to patient: Explained clinic care coordination services and provided contact information if needed in the future.    Pain: Yes, related to pelvic pain that was noted to PCP.   Chronic pain: No  Health Maintenance Reviewed: Due/Overdue    Living Situation:  Current living arrangement: I live in a private home with spouse. Patient noted that she will be returning to [facility] within the next month and did not report any concerns at this time. Patient reported feeling safe within her current living arrangements.   Type of residence: Apartment     Psychosocial:  Hinduism or spiritual beliefs that impact treatment: No  Mental health DX: No  Informal Support system: Family  Financial/Insurance: [employer]  Financial/Insurance concerns: None noted     Resources and Interventions:  Patient denied needing resources at this time. Provided contact information for [facility] if needs arise in the future.     Patient/Caregiver understanding: Pt reports understanding and denies any additional questions or concerns at this times. [facility] engaged in AIDET communication during encounter.    Plan: No further outreaches will be made at this time unless a new referral is made or a change in the pt's status occurs.  Patient was provided with this writer's contact information and encouraged to call with any questions or concerns.    Renuka Hollis \[facility]\""  Clinic Care Coordinator  [phone]  [email or web address]

## 2018-08-10 ENCOUNTER — OFFICE VISIT (OUTPATIENT)
Dept: PEDIATRICS | Facility: CLINIC | Age: 23
End: 2018-08-10
Payer: COMMERCIAL

## 2018-08-10 VITALS
SYSTOLIC BLOOD PRESSURE: 98 MMHG | TEMPERATURE: 98.5 F | WEIGHT: 117.8 LBS | BODY MASS INDEX: 19.63 KG/M2 | HEART RATE: 114 BPM | OXYGEN SATURATION: 99 % | HEIGHT: 65 IN | DIASTOLIC BLOOD PRESSURE: 58 MMHG

## 2018-08-10 DIAGNOSIS — N76.0 BACTERIAL VAGINOSIS: Primary | ICD-10-CM

## 2018-08-10 DIAGNOSIS — N91.2 LACK OF MENSES: ICD-10-CM

## 2018-08-10 DIAGNOSIS — N75.0 BARTHOLIN GLAND CYST: ICD-10-CM

## 2018-08-10 DIAGNOSIS — N89.8 VAGINAL ITCHING: ICD-10-CM

## 2018-08-10 DIAGNOSIS — N89.8 VAGINAL DISCHARGE: ICD-10-CM

## 2018-08-10 DIAGNOSIS — B96.89 BACTERIAL VAGINOSIS: Primary | ICD-10-CM

## 2018-08-10 LAB
BETA HCG QUAL IFA URINE: NEGATIVE
SPECIMEN SOURCE: ABNORMAL
WET PREP SPEC: ABNORMAL

## 2018-08-10 PROCEDURE — 84703 CHORIONIC GONADOTROPIN ASSAY: CPT | Performed by: NURSE PRACTITIONER

## 2018-08-10 PROCEDURE — 87210 SMEAR WET MOUNT SALINE/INK: CPT | Performed by: NURSE PRACTITIONER

## 2018-08-10 PROCEDURE — 99214 OFFICE O/P EST MOD 30 MIN: CPT | Performed by: NURSE PRACTITIONER

## 2018-08-10 RX ORDER — METRONIDAZOLE 500 MG/1
500 TABLET ORAL 2 TIMES DAILY
Qty: 14 TABLET | Refills: 0 | Status: SHIPPED | OUTPATIENT
Start: 2018-08-10

## 2018-08-10 NOTE — PATIENT INSTRUCTIONS
1. I think you have a bartholin gland cyst  2. No antibiotics right now  3. Please see OB next week. They will decide if you need other treatment (drainage of the cyst)  Bartholin s Cyst (No Infection)     The Bartholin s glands are very small glands found inside the vaginal lips (labia).     The Bartholin s glands are very small glands found inside the vaginal lips (labia). The glands make fluid to help keep the vagina moist. When the opening of a Bartholin s gland becomes blocked, the gland may swell and form a cyst. The cyst may vary in size from small to large (1 to 3 cm). It may feel like a firm lump within the labia.  Treatment depends on the size of the cyst, whether it causes symptoms, and whether it s infected. Small or uninfected cysts generally don t require treatment. Large cysts and those that are painful or infected will likely need to be treated. In these cases, the cyst may need to be opened with an incision and then drained. If you are over 40 years of age, your healthcare provide may recommend that the cyst be biopsied.  Home care  Your cyst does not need any treatment at this time. If you have some mild discomfort, you may be advised to take sitz baths. For this, you soak in a bath with a few inches of warm water, a few times a day, or as directed. This may help the cyst to rupture and drain in a few days. No restrictions on activities, such as sex, are needed.  Follow-up care  Follow up with your healthcare provider, or as advised.  When to seek medical advice  Call your healthcare provider right away if any of these occur:    Fever of 100.4 F (38 C) or higher, or as directed by your provider    Increased redness, pain, swelling, or foul-smelling drainage from the cyst or the area around it    Cyst grows larger or causes symptoms that bother you  Date Last Reviewed: 11/1/2017 2000-2017 [employer]. [address] [place] [address], [place] [address]. All rights reserved. This information  is not intended as a substitute for professional medical care. Always follow your healthcare professional's instructions.

## 2018-08-10 NOTE — MR AVS SNAPSHOT
After Visit Summary   8/10/2018    Elle Elizondo    MRN: [id number]           Patient Information     Date Of Birth          1995        Visit Information        Provider Department      8/10/2018 3:00 PM Corazon Ricketts APRN [facility] [place]        Today's Diagnoses     Vaginal itching    -  1    Lack of menses        Bartholin gland cyst          Care Instructions    1. I think you have a bartholin gland cyst  2. No antibiotics right now  3. Please see OB next week. They will decide if you need other treatment (drainage of the cyst)  Bartholin s Cyst (No Infection)     The Bartholin s glands are very small glands found inside the vaginal lips (labia).     The Bartholin s glands are very small glands found inside the vaginal lips (labia). The glands make fluid to help keep the vagina moist. When the opening of a Bartholin s gland becomes blocked, the gland may swell and form a cyst. The cyst may vary in size from small to large (1 to 3 cm). It may feel like a firm lump within the labia.  Treatment depends on the size of the cyst, whether it causes symptoms, and whether it s infected. Small or uninfected cysts generally don t require treatment. Large cysts and those that are painful or infected will likely need to be treated. In these cases, the cyst may need to be opened with an incision and then drained. If you are over 40 years of age, your healthcare provide may recommend that the cyst be biopsied.  Home care  Your cyst does not need any treatment at this time. If you have some mild discomfort, you may be advised to take sitz baths. For this, you soak in a bath with a few inches of warm water, a few times a day, or as directed. This may help the cyst to rupture and drain in a few days. No restrictions on activities, such as sex, are needed.  Follow-up care  Follow up with your healthcare provider, or as advised.  When to seek medical advice  Call your healthcare  provider right away if any of these occur:    Fever of 100.4 F (38 C) or higher, or as directed by your provider    Increased redness, pain, swelling, or foul-smelling drainage from the cyst or the area around it    Cyst grows larger or causes symptoms that bother you  Date Last Reviewed: 11/1/2017 2000-2017 [employer]. [address] [place] [address]. All rights reserved. This information is not intended as a substitute for professional medical care. Always follow your healthcare professional's instructions.                Follow-ups after your visit        Additional Services     OB/GYN REFERRAL       Your provider has referred you to:  [facility]: [facility] [phone]   [email or web address]    [facility]: [facility] [place] [facility] [phone]   [email or web address]    Please be aware that coverage of these services is subject to the terms and limitations of your health insurance plan.  Call member services at your health plan with any benefit or coverage questions.      Please bring the following with you to your appointment:    (1) Any X-Rays, CTs or MRIs which have been performed.  Contact the facility where they were done to arrange for  prior to your scheduled appointment.   (2) List of current medications   (3) This referral request   (4) Any documents/labs given to you for this referral                  Who to contact     If you have questions or need follow up information about today's clinic visit or your schedule please contact [facility] directly at [phone].  Normal or non-critical lab and imaging results will be communicated to you by MyChart, letter or phone within 4 business days after the clinic has received the results. If you do not hear from us within 7 days, please contact the clinic through MyChart or phone. If you have a critical or abnormal lab result, we will notify you by  "phone as soon as possible.  Submit refill requests through [employer] or call your pharmacy and they will forward the refill request to us. Please allow 3 business days for your refill to be completed.          Additional Information About Your Visit        [employer] Resources Information     [employer] gives you secure access to your electronic health record. If you see a primary care provider, you can also send messages to your care team and make appointments. If you have questions, please call your primary care clinic.  If you do not have a primary care provider, please call [phone] and they will assist you.        Care EveryWhere ID     This is your Care EveryWhere ID. This could be used by other organizations to access your [facility] medical records  [id number]        Your Vitals Were     Pulse Temperature Height Last Period Pulse Oximetry BMI (Body Mass Index)    114 98.5  F (36.9  C) (Tympanic) 5' 5\" (1.651 m) 07/11/2018 (Exact Date) 99% 19.6 kg/m2       Blood Pressure from Last 3 Encounters:   08/10/18 98/58   07/23/18 106/62   06/25/18 100/62    Weight from Last 3 Encounters:   08/10/18 117 lb 12.8 oz (53.4 kg)   07/23/18 117 lb 8 oz (53.3 kg)   06/25/18 118 lb 1.6 oz (53.6 kg)              We Performed the Following     Beta HCG Qual, Urine - [facility] and [place] ([id number])     OB/GYN REFERRAL     Wet prep        Primary Care Provider Office Phone # Fax #    Price Jocy Mcclellan -[phone] [address] [facility] [address]        Equal Access to Services     [facility]: Hadii jessenia richmond hadasho Soangela, waaxda luqadaha, qaybta kaalmaannette cooney. So [facility] [phone].    ATENCIÓN: Si habla español, tiene a crocker disposición servicios gratuitos de asistencia lingüística. Llame al [phone].    We comply with applicable federal civil rights laws and [place] laws. We do not discriminate on the basis of race, color, national origin, age, " disability, sex, sexual orientation, or gender identity.            Thank you!     Thank you for choosing [facility]  for your care. Our goal is always to provide you with excellent care. Hearing back from our patients is one way we can continue to improve our services. Please take a few minutes to complete the written survey that you may receive in the mail after your visit with us. Thank you!             Your Updated Medication List - Protect others around you: Learn how to safely use, store and throw away your medicines at www.disposemymeds.org.          This list is accurate as of 8/10/18  3:32 PM.  Always use your most recent med list.                   Brand Name Dispense Instructions for use Diagnosis    drospirenone-ethinyl estradiol 3-0.03 MG per tablet    JORGE    84 tablet    Take 1 tablet by mouth daily    Excessive or frequent menstruation, Dysmenorrhea       hydrocortisone 0.2 % cream    WESTCORT    45 g    Apply sparingly to affected area three times daily as needed.    Facial dermatitis       ibuprofen 600 MG tablet    ADVIL/MOTRIN    90 tablet    Take 1 tablet (600 mg) by mouth every 6 hours as needed for moderate pain    Pelvic pain in female

## 2018-08-10 NOTE — PROGRESS NOTES
"  SUBJECTIVE:   Elle Elizondo is a 22 year old female who presents to clinic today for the following health issues:    Vaginal Symptoms  Bump, itching      Duration: 3 days    Description  vaginal discharge - watery, itching and odor    Intensity:  moderate    Accompanying signs and symptoms (fever/dysuria/abdominal or back pain): None    History  Sexually active: yes, single partner, contraception - oral contraceptives (combined)  Possibility of pregnancy: Maybe - period is late  Recent antibiotic use: no     Precipitating or alleviating factors: None    Therapies tried and outcome: none   Outcome: Symptoms not alleviated    ROS: const/gyn/gu otherwise negative     OBJECTIVE:  BP 98/58 (BP Location: Right arm, Cuff Size: Adult Regular)  Pulse 114  Temp 98.5  F (36.9  C) (Tympanic)  Ht 5' 5\" (1.651 m)  Wt 117 lb 12.8 oz (53.4 kg)  LMP 07/11/2018 (Exact Date)  SpO2 99%  BMI 19.6 kg/m2  CONSTITUTIONAL: Alert, well-nourished, well-groomed, NAD  RESP: Lungs CTA. No wheeze, rhonchi, rales.  CV: HRRR S1 S2 No MRG. No peripheral edema  GYN: Soft, non-fluctuant, non-erythematous, only slightly tender mass left posterior labia minora. No surrounding erythema.     ASSESSMENT/PLAN:  (N76.0,  B96.89) Bacterial vaginosis  (primary encounter diagnosis)  Comment: Treat today.   Plan:   -Flagyl  -Discussed supportive cares and reasons to return      (N75.0) Bartholin gland cyst  Comment: Does not appear acutely infected.   Plan: OB/GYN REFERRAL        Referred to OB for urgent management. She is moving to [facility] in 10 days.     (N91.2) Lack of menses  Comment: UPT negative. She reports having missed a few pills last month.   Plan: Beta HCG Qual, Urine - [facility] and [place]         ([id number])        Re-check UPT in 1-2 weeks if no menses.       Corazon Ricketts, FNP-DNP.        "

## 2018-08-10 NOTE — Clinical Note
Hey there!  I scheduled a patient with you for a bartholyn gland cyst because there was NO OB availability. Can you manage this? If not, are you able to sweet talk one of your partners into seeing this patient next week? She is moving to [facility] the week after!  Thanks!  AGGIE Carranza-OTILIO.

## 2018-08-14 ENCOUNTER — TELEPHONE (OUTPATIENT)
Dept: PEDIATRICS | Facility: CLINIC | Age: 23
End: 2018-08-14

## 2018-08-14 ENCOUNTER — OFFICE VISIT (OUTPATIENT)
Dept: OBGYN | Facility: CLINIC | Age: 23
End: 2018-08-14
Payer: COMMERCIAL

## 2018-08-14 VITALS
DIASTOLIC BLOOD PRESSURE: 62 MMHG | SYSTOLIC BLOOD PRESSURE: 110 MMHG | WEIGHT: 118.3 LBS | BODY MASS INDEX: 19.71 KG/M2 | HEIGHT: 65 IN

## 2018-08-14 DIAGNOSIS — N75.0 BARTHOLIN GLAND CYST: ICD-10-CM

## 2018-08-14 DIAGNOSIS — N94.6 DYSMENORRHEA: ICD-10-CM

## 2018-08-14 DIAGNOSIS — Z30.011 ENCOUNTER FOR INITIAL PRESCRIPTION OF CONTRACEPTIVE PILLS: ICD-10-CM

## 2018-08-14 DIAGNOSIS — N89.8 VAGINAL ITCHING: Primary | ICD-10-CM

## 2018-08-14 DIAGNOSIS — N75.1 BARTHOLIN'S GLAND ABSCESS: ICD-10-CM

## 2018-08-14 DIAGNOSIS — N92.0 EXCESSIVE OR FREQUENT MENSTRUATION: ICD-10-CM

## 2018-08-14 LAB
SPECIMEN SOURCE: NORMAL
WET PREP SPEC: NORMAL

## 2018-08-14 PROCEDURE — 56420 I&D BARTHOLINS GLAND ABSCESS: CPT | Mod: 53 | Performed by: FAMILY MEDICINE

## 2018-08-14 PROCEDURE — 87210 SMEAR WET MOUNT SALINE/INK: CPT | Performed by: FAMILY MEDICINE

## 2018-08-14 PROCEDURE — 99213 OFFICE O/P EST LOW 20 MIN: CPT | Mod: 25 | Performed by: FAMILY MEDICINE

## 2018-08-14 RX ORDER — CLINDAMYCIN HCL 300 MG
300 CAPSULE ORAL 3 TIMES DAILY
Qty: 21 CAPSULE | Refills: 0 | Status: SHIPPED | OUTPATIENT
Start: 2018-08-14 | End: 2018-08-21

## 2018-08-14 RX ORDER — HYDROCODONE BITARTRATE AND ACETAMINOPHEN 5; 325 MG/1; MG/1
1 TABLET ORAL EVERY 6 HOURS PRN
Qty: 10 TABLET | Refills: 0 | Status: SHIPPED | OUTPATIENT
Start: 2018-08-14

## 2018-08-14 RX ORDER — DROSPIRENONE AND ETHINYL ESTRADIOL 0.03MG-3MG
1 KIT ORAL DAILY
Qty: 84 TABLET | Refills: 3 | Status: SHIPPED | OUTPATIENT
Start: 2018-08-14

## 2018-08-14 NOTE — NURSING NOTE
2% Lidocaine with epinephrine used for block.  Lot: -DK  Exp: 2/1/2019  ND: [id number]  Laureen Gamino CMA

## 2018-08-14 NOTE — MR AVS SNAPSHOT
After Visit Summary   8/14/2018    Elle Elizondo    MRN: [id number]           Patient Information     Date Of Birth          1995        Visit Information        Provider Department      8/14/2018 2:30 PM Madelin Maciel,  [facility]        Today's Diagnoses     Vaginal itching    -  1    Excessive or frequent menstruation        Dysmenorrhea        Encounter for initial prescription of contraceptive pills        Bartholin's gland abscess        Bartholin gland cyst          Care Instructions    Return in Thursday or Friday   Ok to overbook   Post op     Dr. Madelin Maciel, DO    Obstetrics and Gynecology  [facility]                 Follow-ups after your visit        Who to contact     If you have questions or need follow up information about today's clinic visit or your schedule please contact [facility] directly at [phone].  Normal or non-critical lab and imaging results will be communicated to you by [employer], letter or phone within 4 business days after the clinic has received the results. If you do not hear from us within 7 days, please contact the clinic through [employer] or phone. If you have a critical or abnormal lab result, we will notify you by phone as soon as possible.  Submit refill requests through [employer] or call your pharmacy and they will forward the refill request to us. Please allow 3 business days for your refill to be completed.          Additional Information About Your Visit        MyChart Information     [employer] gives you secure access to your electronic health record. If you see a primary care provider, you can also send messages to your care team and make appointments. If you have questions, please call your primary care clinic.  If you do not have a primary care provider, please call [phone] and they will assist you.        Care EveryWhere ID     This is your Care EveryWhere ID. This  "could be used by other organizations to access your [facility] medical records  [id number]        Your Vitals Were     Height BMI (Body Mass Index)                5' 5\" (1.651 m) 19.69 kg/m2           Blood Pressure from Last 3 Encounters:   08/14/18 110/62   08/10/18 98/58   07/23/18 106/62    Weight from Last 3 Encounters:   08/14/18 118 lb 4.8 oz (53.7 kg)   08/10/18 117 lb 12.8 oz (53.4 kg)   07/23/18 117 lb 8 oz (53.3 kg)              We Performed the Following     I&D BARTHOLIN GLAND ABSCESS     Wet prep          Today's Medication Changes          These changes are accurate as of 8/14/18  3:21 PM.  If you have any questions, ask your nurse or doctor.               Start taking these medicines.        Dose/Directions    clindamycin 300 MG capsule   Commonly known as:  CLEOCIN   Used for:  Bartholin gland cyst   Started by:  Madelin Maciel DO        Dose:  300 mg   Take 1 capsule (300 mg) by mouth 3 times daily for 7 days   Quantity:  21 capsule   Refills:  0       HYDROcodone-acetaminophen 5-325 MG per tablet   Commonly known as:  NORCO   Used for:  Bartholin gland cyst   Started by:  Madelin Maciel DO        Dose:  1 tablet   Take 1 tablet by mouth every 6 hours as needed for severe pain   Quantity:  10 tablet   Refills:  0            Where to get your medicines      These medications were sent to "[id number] - [place], [place] - [address] AT [address] of [address]  [address], [place] [address]     Phone:  [phone]     clindamycin 300 MG capsule    drospirenone-ethinyl estradiol 3-0.03 MG per tablet         Some of these will need a paper prescription and others can be bought over the counter.  Ask your nurse if you have questions.     Bring a paper prescription for each of these medications     HYDROcodone-acetaminophen 5-325 MG per tablet               Information about OPIOIDS     PRESCRIPTION OPIOIDS: WHAT YOU NEED TO KNOW   We gave you an opioid " (narcotic) pain medicine. It is important to manage your pain, but opioids are not always the best choice. You should first try all the other options your care team gave you. Take this medicine for as short a time (and as few doses) as possible.    Some activities can increase your pain, such as bandage changes or therapy sessions. It may help to take your pain medicine 30 to 60 minutes before these activities. Reduce your stress by getting enough sleep, working on hobbies you enjoy and practicing relaxation or meditation. Talk to your care team about ways to manage your pain beyond prescription opioids.    These medicines have risks:    DO NOT drive when on new or higher doses of pain medicine. These medicines can affect your alertness and reaction times, and you could be arrested for driving under the influence (DUI). If you need to use opioids long-term, talk to your care team about driving.    DO NOT operate heavy machinery    DO NOT do any other dangerous activities while taking these medicines.    DO NOT drink any alcohol while taking these medicines.     If the opioid prescribed includes acetaminophen, DO NOT take with any other medicines that contain acetaminophen. Read all labels carefully. Look for the word  acetaminophen  or  Tylenol.  Ask your pharmacist if you have questions or are unsure.    You can get addicted to pain medicines, especially if you have a history of addiction (chemical, alcohol or substance dependence). Talk to your care team about ways to reduce this risk.    All opioids tend to cause constipation. Drink plenty of water and eat foods that have a lot of fiber, such as fruits, vegetables, prune juice, apple juice and high-fiber cereal. Take a laxative (Miralax, milk of magnesia, Colace, Senna) if you don t move your bowels at least every other day. Other side effects include upset stomach, sleepiness, dizziness, throwing up, tolerance (needing more of the medicine to have the same  effect), physical dependence and slowed breathing.    Store your pills in a secure place, locked if possible. We will not replace any lost or stolen medicine. If you don t finish your medicine, please throw away (dispose) as directed by your pharmacist. The [place] Pollution Control Agency has more information about safe disposal: https://www.pca.[facility].[place].us/living-green/managing-unwanted-medications         Primary Care Provider Office Phone # Fax #    Price Mcclellan [phone] [address] [facility] [address]        Equal Access to Services     [facility]: Hadii jessenia ku hadasho Soomaali, waaxda luqadaha, qaybta kaalmada desi, annette parikh . So [facility] [phone].    ATENCIÓN: Si habla español, tiene a crocker disposición servicios gratuitos de asistencia lingüística. Rosangela al [phone].    We comply with applicable federal civil rights laws and [place] laws. We do not discriminate on the basis of race, color, national origin, age, disability, sex, sexual orientation, or gender identity.            Thank you!     Thank you for choosing [facility]  for your care. Our goal is always to provide you with excellent care. Hearing back from our patients is one way we can continue to improve our services. Please take a few minutes to complete the written survey that you may receive in the mail after your visit with us. Thank you!             Your Updated Medication List - Protect others around you: Learn how to safely use, store and throw away your medicines at www.disposemymeds.org.          This list is accurate as of 8/14/18  3:21 PM.  Always use your most recent med list.                   Brand Name Dispense Instructions for use Diagnosis    clindamycin 300 MG capsule    CLEOCIN    21 capsule    Take 1 capsule (300 mg) by mouth 3 times daily for 7 days    Bartholin gland cyst       drospirenone-ethinyl estradiol 3-0.03 MG  per tablet    JORGE    84 tablet    Take 1 tablet by mouth daily    Excessive or frequent menstruation, Dysmenorrhea       HYDROcodone-acetaminophen 5-325 MG per tablet    NORCO    10 tablet    Take 1 tablet by mouth every 6 hours as needed for severe pain    Bartholin gland cyst       hydrocortisone 0.2 % cream    WESTCORT    45 g    Apply sparingly to affected area three times daily as needed.    Facial dermatitis       ibuprofen 600 MG tablet    ADVIL/MOTRIN    90 tablet    Take 1 tablet (600 mg) by mouth every 6 hours as needed for moderate pain    Pelvic pain in female       metroNIDAZOLE 500 MG tablet    FLAGYL    14 tablet    Take 1 tablet (500 mg) by mouth 2 times daily    Vaginal discharge

## 2018-08-14 NOTE — PROGRESS NOTES
SUBJECTIVE:  Elle Elizondo is an 22 year old  woman who presents for   gynecology consult for Bartholin Cyst.  No LMP recorded. Patient is not currently having periods (Reason: Irregular Periods). Periods are usually regular q 28-30 days, usually lasting 3-6 days.   However her period is late this month,   Menarche @ age 14 , Dysmenorrhea:mild, occurring throughout menses. Cyclic symptoms   include pelvic pain/cramping. No intermenstrual bleeding,   spotting, or discharge.    Current contraception: oral contraceptives  History of abnormal Pap smear: No  Family history of uterine or ovarian cancer: No  History of abnormal mammogram: No  Family history of breast cancer: No    Concerns today: Patient reports a bartholin cyst, which is getting smaller. She also notes worsening vaginal itching.       Past Medical History:   Diagnosis Date     NO ACTIVE PROBLEMS           Family History   Problem Relation Age of Onset     Family History Negative Mother      Family History Negative Father      Family History Negative Brother      Family History Negative Brother      Family History Negative Brother        Past Surgical History:   Procedure Laterality Date     APPENDECTOMY         Current Outpatient Prescriptions   Medication     drospirenone-ethinyl estradiol (JORGE) 3-0.03 MG per tablet     metroNIDAZOLE (FLAGYL) 500 MG tablet     hydrocortisone (WESTCORT) 0.2 % cream     ibuprofen (ADVIL/MOTRIN) 600 MG tablet     No current facility-administered medications for this visit.      Allergies   Allergen Reactions     Rodman        Social History   Substance Use Topics     Smoking status: Never Smoker     Smokeless tobacco: Never Used     Alcohol use No       Review Of Systems  Ears/Nose/Throat: negative  Respiratory: No shortness of breath, dyspnea on exertion, cough, or hemoptysis  Cardiovascular: negative  Gastrointestinal: negative  Genitourinary: see HPI  Constitutional, HEENT, cardiovascular,  "pulmonary, GI, , musculoskeletal, neuro, skin, endocrine and psych systems are negative, except as otherwise noted.    This document serves as a record of the services and decisions personally performed and made by Madelin Maciel DO. It was created on her behalf by Prema Fox, a trained medical scribe. The creation of this document is based on the provider's statements to the medical scribe.  Prema Fox 2:54 PM 2018      OBJECTIVE:  /62  Ht 1.651 m (5' 5\")  Wt 53.7 kg (118 lb 4.8 oz)  BMI 19.69 kg/m2  General appearance: healthy, alert and no distress  Lungs: negative, Percussion normal. Good diaphragmatic excursion. Lungs clear  Heart: negative, PMI normal. No lifts, heaves, or thrills. RRR. No murmurs, clicks gallops or rub  Abdomen: Abdomen soft, non-tender. BS normal. No masses, organomegaly  Pelvic: Pelvic:  Pelvic examination without pap/ Gonorrhea and Chlamydia   including  bartholin cyst on left labia   and vagina normal rugatted not atrophic  Examination of urethra normal no masses, tenderness, scarring  bladder, no masses or tenderness  Cervix no lesions or discharge  Bimanual exam with   Uterus 6 weeks size, mid position, mobile, no tenderness, no descent   Adnexa/parametria  Normal, no masses       Procedure:  Informed consent is signed.    Bartholin's gland is located on left lower labia, cleansed with betadine, 3 cc lidocaine (1%) infiltrated,   Betadine prepped again.  11 blade scalpel used to incise the area and no fluid was expelled, the cyst was determined with additional pressure to be deeper, procedure was terminated.  Hemostasis was then assured with pressure.   The patient tolerated the procedure well.  Instructed to shower daily.  Return to clinic in one week or prn       ASSESSMENT:  Elle Elizondo is an 22 year old  woman who presents for   gynecology consult for Bartholin Cyst.     PLAN:  Dx:  1)  Bartholin Glad Cyst: Procedure: Attempted Incision " and Drainage with no fluid expelled, cyst too deep    - Rx: 5-325 MG Hydrocodone-acetaminophen    - Rx: 300 MG clindamycin   2)  Vaginal Itching: Wet prep pending   2)  Contraception: Rx: drospirenone-ethinyl estradiol   3)  Return to clinic this upcoming Thursday or Friday for reassessment, precautions given    Rx: drospirenone-ethinyl estradiol     The information in this document, created by the medical scribe for me, accurately reflects the services I personally performed and the decisions made by me. I have reviewed and approved this document for accuracy prior to leaving the patient care area.  August 14, 2018 3:23 PM    Dr. Madelin Maciel, DO    Obstetrics and Gynecology  [facility] [place]

## 2018-08-14 NOTE — TELEPHONE ENCOUNTER
Dr. Jennings's:    The Pt saw you today to have cyst assessed. Looks like you attempted to remove cyst however appears to be too deep.   Antibiotics and Narcotic prescribed.     The Pt and her  called in with concerns for blood loss. They are currently driving home from appointment. The Pt is wearing a pad.   They are unsure if the Pt is actively bleeding at this time, concern mainly lies with the blood loss that immediately followed procedure which I assured the Pt is normal.   Advised them by the time they get home, bleeding should have stopped or slowed down quite a bit. If bleeding is still active, apply pressure to the site for 10 minutes. If bleeding is heavy or gets worse, go to ER.     They will check on bleeding status as soon as they get home. Pt and  were appreciative of reassurance given. Advised them area is very vascular and bleeding right after procedure is expected. Bleeding will slow down and stop soon after procedure. May have some light spotting for 24 hours. Again, ER if any heavy bleeding or fever.     Please advise.     Nelida Rivers RN -- [facility]

## 2018-08-14 NOTE — PATIENT INSTRUCTIONS
Return in Thursday or Friday   Ok to overbook   Post op     Dr. Madelin Maciel, DO    Obstetrics and Gynecology  [facility] and [place]

## 2018-08-16 NOTE — TELEPHONE ENCOUNTER
Dicussed with the patient   Was bleeding again.  Please add   To schedule at 10 am   [address]   Please call and make sure she knows the address       Dr. Madelin Maciel, DO    Obstetrics and Gynecology  [facility] [place] and [place]

## 2018-09-26 ENCOUNTER — HEALTH MAINTENANCE LETTER (OUTPATIENT)
Age: 23
End: 2018-09-26

## 2018-10-17 ENCOUNTER — HEALTH MAINTENANCE LETTER (OUTPATIENT)
Age: 23
End: 2018-10-17

## 2018-11-13 DIAGNOSIS — R10.2 PELVIC PAIN IN FEMALE: ICD-10-CM

## 2018-11-13 DIAGNOSIS — N94.6 DYSMENORRHEA: ICD-10-CM

## 2018-11-13 DIAGNOSIS — N92.0 EXCESSIVE OR FREQUENT MENSTRUATION: ICD-10-CM

## 2018-11-13 DIAGNOSIS — M26.609 TEMPOROMANDIBULAR JOINT DISORDER: ICD-10-CM

## 2018-11-13 RX ORDER — DROSPIRENONE AND ETHINYL ESTRADIOL 0.03MG-3MG
1 KIT ORAL DAILY
Qty: 84 TABLET | Refills: 3 | Status: CANCELLED | OUTPATIENT
Start: 2018-11-13

## 2018-11-14 RX ORDER — IBUPROFEN 600 MG/1
600 TABLET, FILM COATED ORAL EVERY 6 HOURS PRN
Qty: 90 TABLET | Refills: 1 | Status: SHIPPED | OUTPATIENT
Start: 2018-11-14

## 2018-11-14 NOTE — TELEPHONE ENCOUNTER
"Requested Prescriptions   Pending Prescriptions Disp Refills     ibuprofen (ADVIL/MOTRIN) 600 MG tablet [Pharmacy Med Name: IBUPROFEN 600MG TABLETS]    Last Written Prescription Date:  7/23/2018  Last Fill Quantity: 90,  # refills: 1   Last office visit: 8/10/2018 with prescribing provider:  Price Mcclellan     Future Office Visit:     15 tablet 0     Sig: TAKE 1 TABLET(600 MG) BY MOUTH EVERY 6 HOURS AS NEEDED FOR MODERATE PAIN    NSAID Medications Passed    11/13/2018  3:35 PM       Passed - Blood pressure under 140/90 in past 12 months    BP Readings from Last 3 Encounters:   08/14/18 110/62   08/10/18 98/58   07/23/18 106/62                Passed - Normal ALT on file in past 12 months    Recent Labs   Lab Test  01/17/18   1235   ALT  14            Passed - Normal AST on file in past 12 months    Recent Labs   Lab Test  01/17/18   1235   AST  15            Passed - Recent (12 mo) or future (30 days) visit within the authorizing provider's specialty    Patient had office visit in the last 12 months or has a visit in the next 30 days with authorizing provider or within the authorizing provider's specialty.  See \"Patient Info\" tab in inbasket, or \"Choose Columns\" in Meds & Orders section of the refill encounter.             Passed - Patient is age 6-64 years       Passed - Normal CBC on file in past 12 months    Recent Labs   Lab Test  06/25/18   1353   WBC  8.8   RBC  4.65   HGB  13.9   HCT  40.6   PLT  273                Passed - No active pregnancy on record       Passed - Normal serum creatinine on file in past 12 months    Recent Labs   Lab Test  06/25/18   1353   CR  0.76            Passed - No positive pregnancy test in past 12 months          "

## 2018-11-14 NOTE — TELEPHONE ENCOUNTER
[facility]  Last Written Prescription Date:  8-14-18  Last Fill Quantity: 84,  # refills: 3   Last office visit: 8/10/2018 with prescribing provider:  JEANNINE RODRIGUEZ CNP   This is duplicate Rx   Ibuprofen filled 7-23-18 #90+ 1 R  Future Office Visit:    ibuprofen (ADVIL/MOTRIN) 600 MG tablet  Take 1 tablet (600 mg) by mouth every 6 hours as needed for moderate pain  Disp: 90 tablet Refills: 1    Class: E-Prescribe Start: 11/13/2018   For: Pelvic pain in female  Originally ordered: 9 months ago by Price Mcclellan MD  NSAID Medications Ydsarz16/13 9:23 AM   Blood pressure under 140/90 in past 12 months    Normal ALT on file in past 12 months    Normal AST on file in past 12 months    Recent (12 mo) or future (30 days) visit within the authorizing provider's specialty    Patient is age 6-64 years    Normal CBC on file in past 12 months    No active pregnancy on record    Normal serum creatinine on file in past 12 months    No positive pregnancy test in past 12 months   Protocol Details  drospirenone-ethinyl estradiol (TAYLOR) 3-0.03 MG per tablet  Take 1 tablet by mouth daily       Disp: 84 tablet Refills: 3    Class: E-Prescribe Start: 11/13/2018   For: Excessive or frequent menstruation, Dysmenorrhea  Originally ordered: 7 months ago by Damaso Mendez MD  Contraceptives Protocol Csshxa88/13 9:23 AM   Patient is not a current smoker if age is 35 or older    Recent (12 mo) or future (30 days) visit within the authorizing provider's specialty    No active pregnancy on record    No positive pregnancy test in past 12 months   Protocol Details  To be filled at: [id number] - [place], [place] - [address] AT [facility] [address]: [phone]  Ibuprofen filled PSO.  Juanita Savage RN

## 2018-11-16 RX ORDER — IBUPROFEN 600 MG/1
TABLET, FILM COATED ORAL
Qty: 15 TABLET | Refills: 0 | OUTPATIENT
Start: 2018-11-16

## 2020-03-11 ENCOUNTER — HEALTH MAINTENANCE LETTER (OUTPATIENT)
Age: 25
End: 2020-03-11

## 2021-01-03 ENCOUNTER — HEALTH MAINTENANCE LETTER (OUTPATIENT)
Age: 26
End: 2021-01-03

## 2021-04-25 ENCOUNTER — HEALTH MAINTENANCE LETTER (OUTPATIENT)
Age: 26
End: 2021-04-25

## 2021-10-10 ENCOUNTER — HEALTH MAINTENANCE LETTER (OUTPATIENT)
Age: 26
End: 2021-10-10

## 2022-05-21 ENCOUNTER — HEALTH MAINTENANCE LETTER (OUTPATIENT)
Age: 27
End: 2022-05-21

## 2022-09-17 ENCOUNTER — HEALTH MAINTENANCE LETTER (OUTPATIENT)
Age: 27
End: 2022-09-17

## 2023-06-04 ENCOUNTER — HEALTH MAINTENANCE LETTER (OUTPATIENT)
Age: 28
End: 2023-06-04